# Patient Record
Sex: MALE | Race: WHITE | NOT HISPANIC OR LATINO | Employment: OTHER | URBAN - METROPOLITAN AREA
[De-identification: names, ages, dates, MRNs, and addresses within clinical notes are randomized per-mention and may not be internally consistent; named-entity substitution may affect disease eponyms.]

---

## 2021-06-21 PROCEDURE — 88305 TISSUE EXAM BY PATHOLOGIST: CPT | Performed by: INTERNAL MEDICINE

## 2021-06-22 ENCOUNTER — LAB REQUISITION (OUTPATIENT)
Dept: LAB | Facility: HOSPITAL | Age: 56
End: 2021-06-22

## 2021-06-22 DIAGNOSIS — Z00.00 ENCOUNTER FOR GENERAL ADULT MEDICAL EXAMINATION WITHOUT ABNORMAL FINDINGS: ICD-10-CM

## 2021-06-23 LAB
LAB AP CASE REPORT: NORMAL
PATH REPORT.FINAL DX SPEC: NORMAL
PATH REPORT.GROSS SPEC: NORMAL

## 2022-02-05 ENCOUNTER — HOSPITAL ENCOUNTER (EMERGENCY)
Facility: HOSPITAL | Age: 57
Discharge: HOME | End: 2022-02-05
Attending: EMERGENCY MEDICINE | Admitting: EMERGENCY MEDICINE

## 2022-02-05 ENCOUNTER — APPOINTMENT (OUTPATIENT)
Dept: GENERAL RADIOLOGY | Facility: HOSPITAL | Age: 57
End: 2022-02-05

## 2022-02-05 VITALS
BODY MASS INDEX: 24.44 KG/M2 | OXYGEN SATURATION: 94 % | RESPIRATION RATE: 18 BRPM | SYSTOLIC BLOOD PRESSURE: 133 MMHG | WEIGHT: 165 LBS | TEMPERATURE: 98.2 F | HEIGHT: 69 IN | HEART RATE: 71 BPM | DIASTOLIC BLOOD PRESSURE: 94 MMHG
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
## 2022-02-05 DIAGNOSIS — M25.522 ELBOW PAIN, LEFT: ICD-10-CM

## 2022-02-05 DIAGNOSIS — M94.0 COSTOCHONDRITIS: ICD-10-CM

## 2022-02-05 PROCEDURE — 99283 EMERGENCY DEPT VISIT LOW MDM: CPT

## 2022-02-05 PROCEDURE — 71101 X-RAY EXAM UNILAT RIBS/CHEST: CPT

## 2022-02-05 PROCEDURE — 73070 X-RAY EXAM OF ELBOW: CPT

## 2022-02-05 RX ORDER — IBUPROFEN 400 MG/1
800 TABLET ORAL ONCE
Status: COMPLETED | OUTPATIENT
Start: 2022-02-05 | End: 2022-02-05

## 2022-02-05 RX ADMIN — IBUPROFEN 800 MG: 400 TABLET, FILM COATED ORAL at 09:06

## 2022-02-05 NOTE — ED PROVIDER NOTES
Subjective   Patient presents today d/t a fall that occurred this morning.  Patient states that he was in a car lot and was looking in a car and tripped and fell on ice and  landed on his left side and hurt his left elbow and ribs.  Patient was not given analgesics upon arrival.  Patient rates the pain 8 out of 10, pain is localized to the left ribs with no radiation.  Patient states that it hurts to laugh and inhale deeply. Pt denies any blood thinners. Pt denies hitting his head.       History provided by:  Patient   used: No    Fall  Mechanism of injury: fall    Injury location:  Shoulder/arm and torso  Shoulder/arm injury location:  L elbow  Torso injury location:  L chest  Incident location: car lot.  Arrived directly from scene: yes    Fall:     Fall occurred: slipped on ice.    Impact surface:  [place]    Point of impact: L arm     Entrapped after fall: no    Prior to arrival data:     Bystander interventions:  None    Blood loss:  None    Loss of consciousness: no    Associated symptoms: no loss of consciousness, no nausea and no vomiting        Review of Systems   Constitutional: Negative.  Negative for chills and fever.   HENT: Negative.    Eyes: Negative.    Respiratory: Negative.    Cardiovascular: Negative.    Gastrointestinal: Negative.  Negative for diarrhea, nausea and vomiting.   Endocrine: Negative.    Genitourinary: Negative.    Musculoskeletal: Positive for myalgias (L side of chest ).   Skin: Negative.    Allergic/Immunologic: Negative.    Neurological: Negative.  Negative for loss of consciousness.   Hematological: Negative.    Psychiatric/Behavioral: Negative.        History reviewed. No pertinent past medical history.    Allergies   Allergen Reactions   • Hydrocodone GI Intolerance     N/V         History reviewed. No pertinent surgical history.    History reviewed. No pertinent family history.    Social History     Socioeconomic History   • Marital status:             Objective   Physical Exam  Vitals and nursing note reviewed.   Constitutional:       Appearance: Normal appearance.   HENT:      Head: Normocephalic and atraumatic.      Nose: Nose normal.      Mouth/Throat:      Mouth: Mucous membranes are moist.   Eyes:      Extraocular Movements: Extraocular movements intact.   Cardiovascular:      Rate and Rhythm: Normal rate and regular rhythm.   Pulmonary:      Effort: Pulmonary effort is normal.      Breath sounds: Normal breath sounds.   Musculoskeletal:         General: Tenderness present. No swelling. Normal range of motion.      Cervical back: Normal range of motion and neck supple.      Comments: TTP over the L elbow and L thoracic   No erythema ecchymosis noted   Full ROM of the L arm      Skin:     General: Skin is warm and dry.   Neurological:      General: No focal deficit present.      Mental Status: He is alert and oriented to person, place, and time.   Psychiatric:         Mood and Affect: Mood normal.         Behavior: Behavior normal.           MDM  Number of Diagnoses or Management Options  Costochondritis  Elbow pain, left  Fall, initial encounter  Diagnosis management comments: I have spoken with patient. I have explained the patient´s condition, diagnoses and treatment plan based on the information available to me at this time. I have answered the patient's questions and addressed any concerns. The patient has a good  understanding of the patient´s diagnosis, condition, and treatment plan as can be expected at this point. The vital signs have been stable. The patient´s condition is stable and appropriate for discharge from the emergency department.      The patient will pursue further outpatient evaluation with the primary care physician or other designated or consulting physician as outlined in the discharge instructions. They are agreeable to this plan of care and follow-up instructions have been explained in detail. The patient has received  these instructions in written format and have expressed an understanding of the discharge instructions. The patient is aware that any significant change in condition or worsening of symptoms should prompt an immediate return to this or the closest emergency department or call to 911.         Amount and/or Complexity of Data Reviewed  Tests in the radiology section of CPT®: reviewed and ordered    Risk of Complications, Morbidity, and/or Mortality  Presenting problems: low  Diagnostic procedures: low  Management options: low    Patient Progress  Patient progress: stable      Final diagnoses:   Fall, initial encounter   Elbow pain, left   Costochondritis       ED Disposition  ED Disposition     ED Disposition Condition Comment    Discharge Stable           Provider, No Known  [place] [id number]      If symptoms worsen         Medication List      No changes were made to your prescriptions during this visit.          Trina De La O PA-C  02/05/22 0907

## 2022-02-05 NOTE — DISCHARGE INSTRUCTIONS
Rest, can apply heat/ice to Lateral L chest 20 minutes on/off as needed.  Take ibuprofen as needed for pain. Can take up to 2400 mg/24 hours.

## 2022-02-09 ENCOUNTER — APPOINTMENT (OUTPATIENT)
Dept: CT IMAGING | Facility: HOSPITAL | Age: 57
End: 2022-02-09

## 2022-02-09 ENCOUNTER — APPOINTMENT (OUTPATIENT)
Dept: GENERAL RADIOLOGY | Facility: HOSPITAL | Age: 57
End: 2022-02-09

## 2022-02-09 ENCOUNTER — HOSPITAL ENCOUNTER (INPATIENT)
Facility: HOSPITAL | Age: 57
LOS: 2 days | Discharge: HOME | End: 2022-02-11
Attending: EMERGENCY MEDICINE | Admitting: INTERNAL MEDICINE

## 2022-02-09 DIAGNOSIS — S27.0XXA CLOSED TRAUMATIC FRACTURE OF RIBS OF LEFT SIDE WITH PNEUMOTHORAX: Primary | ICD-10-CM

## 2022-02-09 DIAGNOSIS — S22.42XA CLOSED TRAUMATIC FRACTURE OF RIBS OF LEFT SIDE WITH PNEUMOTHORAX: Primary | ICD-10-CM

## 2022-02-09 PROBLEM — K57.92 DIVERTICULITIS: Status: ACTIVE | Noted: 2022-02-09

## 2022-02-09 PROBLEM — R09.02 HYPOXIA: Status: ACTIVE | Noted: 2022-02-09

## 2022-02-09 PROBLEM — K21.9 GASTROESOPHAGEAL REFLUX DISEASE: Status: ACTIVE | Noted: 2019-01-07

## 2022-02-09 PROBLEM — Z86.711 HISTORY OF PULMONARY EMBOLISM: Status: ACTIVE | Noted: 2022-02-09

## 2022-02-09 PROBLEM — M75.42 INTERNAL IMPINGEMENT OF LEFT SHOULDER: Status: ACTIVE | Noted: 2022-02-09

## 2022-02-09 PROBLEM — R91.1 LUNG NODULE: Status: ACTIVE | Noted: 2019-01-07

## 2022-02-09 PROBLEM — R06.83 SNORING: Status: ACTIVE | Noted: 2022-02-09

## 2022-02-09 PROBLEM — M17.12 PRIMARY OSTEOARTHRITIS OF LEFT KNEE: Status: ACTIVE | Noted: 2020-08-17

## 2022-02-09 PROBLEM — J45.909 ASTHMA: Status: ACTIVE | Noted: 2021-06-30

## 2022-02-09 PROBLEM — Z98.890 HISTORY OF SHOULDER SURGERY: Status: ACTIVE | Noted: 2021-02-16

## 2022-02-09 PROBLEM — S43.439A GLENOID LABRUM TEAR: Status: ACTIVE | Noted: 2020-10-15

## 2022-02-09 PROBLEM — Z91.89 HISTORY OF ANXIETY STATE: Status: ACTIVE | Noted: 2020-12-31

## 2022-02-09 PROBLEM — Z96.9 RETAINED ORTHOPEDIC HARDWARE: Status: ACTIVE | Noted: 2022-02-09

## 2022-02-09 PROBLEM — H91.90 HEARING LOSS: Status: ACTIVE | Noted: 2022-02-09

## 2022-02-09 LAB
ALBUMIN SERPL-MCNC: 4.5 G/DL (ref 3.5–5.2)
ALBUMIN/GLOB SERPL: 1.5 G/DL
ALP SERPL-CCNC: 99 U/L (ref 39–117)
ALT SERPL W P-5'-P-CCNC: 9 U/L (ref 1–41)
ANION GAP SERPL CALCULATED.3IONS-SCNC: 10 MMOL/L (ref 5–15)
AST SERPL-CCNC: 12 U/L (ref 1–40)
BASOPHILS # BLD AUTO: 0.04 10*3/MM3 (ref 0–0.2)
BASOPHILS NFR BLD AUTO: 0.5 % (ref 0–1.5)
BILIRUB SERPL-MCNC: 0.5 MG/DL (ref 0–1.2)
BUN SERPL-MCNC: 13 MG/DL (ref 6–20)
BUN/CREAT SERPL: 14.8 (ref 7–25)
CALCIUM SPEC-SCNC: 8.9 MG/DL (ref 8.6–10.5)
CHLORIDE SERPL-SCNC: 101 MMOL/L (ref 98–107)
CO2 SERPL-SCNC: 25 MMOL/L (ref 22–29)
CREAT SERPL-MCNC: 0.88 MG/DL (ref 0.76–1.27)
DEPRECATED RDW RBC AUTO: 41.8 FL (ref 37–54)
EOSINOPHIL # BLD AUTO: 0.56 10*3/MM3 (ref 0–0.4)
EOSINOPHIL NFR BLD AUTO: 6.7 % (ref 0.3–6.2)
ERYTHROCYTE [DISTWIDTH] IN BLOOD BY AUTOMATED COUNT: 13.2 % (ref 12.3–15.4)
GFR SERPL CREATININE-BSD FRML MDRD: 89 ML/MIN/1.73
GLOBULIN UR ELPH-MCNC: 3.1 GM/DL
GLUCOSE FLD-MCNC: 13 MG/DL
GLUCOSE SERPL-MCNC: 102 MG/DL (ref 65–99)
HCT VFR BLD AUTO: 48.7 % (ref 37.5–51)
HGB BLD-MCNC: 16.3 G/DL (ref 13–17.7)
IMM GRANULOCYTES # BLD AUTO: 0.02 10*3/MM3 (ref 0–0.05)
IMM GRANULOCYTES NFR BLD AUTO: 0.2 % (ref 0–0.5)
LDH FLD-CCNC: >834 U/L
LYMPHOCYTES # BLD AUTO: 1.4 10*3/MM3 (ref 0.7–3.1)
LYMPHOCYTES NFR BLD AUTO: 16.7 % (ref 19.6–45.3)
MCH RBC QN AUTO: 29.3 PG (ref 26.6–33)
MCHC RBC AUTO-ENTMCNC: 33.5 G/DL (ref 31.5–35.7)
MCV RBC AUTO: 87.6 FL (ref 79–97)
MONOCYTES # BLD AUTO: 0.88 10*3/MM3 (ref 0.1–0.9)
MONOCYTES NFR BLD AUTO: 10.5 % (ref 5–12)
NEUTROPHILS NFR BLD AUTO: 5.47 10*3/MM3 (ref 1.7–7)
NEUTROPHILS NFR BLD AUTO: 65.4 % (ref 42.7–76)
NRBC BLD AUTO-RTO: 0 /100 WBC (ref 0–0.2)
PH FLD: 7.08 [PH]
PLATELET # BLD AUTO: 253 10*3/MM3 (ref 140–450)
PMV BLD AUTO: 9.8 FL (ref 6–12)
POTASSIUM SERPL-SCNC: 3.8 MMOL/L (ref 3.5–5.2)
PROT FLD-MCNC: 5.1 G/DL
PROT SERPL-MCNC: 7.6 G/DL (ref 6–8.5)
RBC # BLD AUTO: 5.56 10*6/MM3 (ref 4.14–5.8)
SARS-COV-2 RNA RESP QL NAA+PROBE: NOT DETECTED
SODIUM SERPL-SCNC: 136 MMOL/L (ref 136–145)
WBC NRBC COR # BLD: 8.37 10*3/MM3 (ref 3.4–10.8)

## 2022-02-09 PROCEDURE — 84157 ASSAY OF PROTEIN OTHER: CPT | Performed by: NURSE PRACTITIONER

## 2022-02-09 PROCEDURE — 85025 COMPLETE CBC W/AUTO DIFF WBC: CPT | Performed by: EMERGENCY MEDICINE

## 2022-02-09 PROCEDURE — 88305 TISSUE EXAM BY PATHOLOGIST: CPT | Performed by: NURSE PRACTITIONER

## 2022-02-09 PROCEDURE — 75989 ABSCESS DRAINAGE UNDER X-RAY: CPT

## 2022-02-09 PROCEDURE — 87070 CULTURE OTHR SPECIMN AEROBIC: CPT | Performed by: NURSE PRACTITIONER

## 2022-02-09 PROCEDURE — 25010000002 HYDROMORPHONE 1 MG/ML SOLUTION: Performed by: RADIOLOGY

## 2022-02-09 PROCEDURE — 83986 ASSAY PH BODY FLUID NOS: CPT | Performed by: NURSE PRACTITIONER

## 2022-02-09 PROCEDURE — 94640 AIRWAY INHALATION TREATMENT: CPT

## 2022-02-09 PROCEDURE — C1729 CATH, DRAINAGE: HCPCS

## 2022-02-09 PROCEDURE — 80053 COMPREHEN METABOLIC PANEL: CPT | Performed by: EMERGENCY MEDICINE

## 2022-02-09 PROCEDURE — 0 LIDOCAINE 1 % SOLUTION: Performed by: RADIOLOGY

## 2022-02-09 PROCEDURE — 87206 SMEAR FLUORESCENT/ACID STAI: CPT | Performed by: NURSE PRACTITIONER

## 2022-02-09 PROCEDURE — 99221 1ST HOSP IP/OBS SF/LOW 40: CPT | Performed by: NURSE PRACTITIONER

## 2022-02-09 PROCEDURE — 83615 LACTATE (LD) (LDH) ENZYME: CPT | Performed by: NURSE PRACTITIONER

## 2022-02-09 PROCEDURE — 82945 GLUCOSE OTHER FLUID: CPT | Performed by: NURSE PRACTITIONER

## 2022-02-09 PROCEDURE — 71046 X-RAY EXAM CHEST 2 VIEWS: CPT

## 2022-02-09 PROCEDURE — U0003 INFECTIOUS AGENT DETECTION BY NUCLEIC ACID (DNA OR RNA); SEVERE ACUTE RESPIRATORY SYNDROME CORONAVIRUS 2 (SARS-COV-2) (CORONAVIRUS DISEASE [COVID-19]), AMPLIFIED PROBE TECHNIQUE, MAKING USE OF HIGH THROUGHPUT TECHNOLOGIES AS DESCRIBED BY CMS-2020-01-R: HCPCS | Performed by: EMERGENCY MEDICINE

## 2022-02-09 PROCEDURE — 87116 MYCOBACTERIA CULTURE: CPT | Performed by: NURSE PRACTITIONER

## 2022-02-09 PROCEDURE — 71250 CT THORAX DX C-: CPT

## 2022-02-09 PROCEDURE — 0W9B30Z DRAINAGE OF LEFT PLEURAL CAVITY WITH DRAINAGE DEVICE, PERCUTANEOUS APPROACH: ICD-10-PCS | Performed by: RADIOLOGY

## 2022-02-09 PROCEDURE — 88112 CYTOPATH CELL ENHANCE TECH: CPT | Performed by: NURSE PRACTITIONER

## 2022-02-09 PROCEDURE — 94799 UNLISTED PULMONARY SVC/PX: CPT

## 2022-02-09 PROCEDURE — 99284 EMERGENCY DEPT VISIT MOD MDM: CPT

## 2022-02-09 RX ORDER — CELECOXIB 100 MG/1
100 CAPSULE ORAL EVERY 12 HOURS SCHEDULED
Status: DISCONTINUED | OUTPATIENT
Start: 2022-02-09 | End: 2022-02-11 | Stop reason: HOSPADM

## 2022-02-09 RX ORDER — UREA 10 %
1 LOTION (ML) TOPICAL NIGHTLY PRN
Status: DISCONTINUED | OUTPATIENT
Start: 2022-02-09 | End: 2022-02-11 | Stop reason: HOSPADM

## 2022-02-09 RX ORDER — ACETAMINOPHEN 325 MG/1
650 TABLET ORAL EVERY 4 HOURS PRN
Status: DISCONTINUED | OUTPATIENT
Start: 2022-02-09 | End: 2022-02-11 | Stop reason: HOSPADM

## 2022-02-09 RX ORDER — BENZONATATE 100 MG/1
200 CAPSULE ORAL 3 TIMES DAILY PRN
Status: DISCONTINUED | OUTPATIENT
Start: 2022-02-09 | End: 2022-02-11 | Stop reason: HOSPADM

## 2022-02-09 RX ORDER — BUDESONIDE AND FORMOTEROL FUMARATE DIHYDRATE 160; 4.5 UG/1; UG/1
2 AEROSOL RESPIRATORY (INHALATION)
Status: DISCONTINUED | OUTPATIENT
Start: 2022-02-09 | End: 2022-02-11 | Stop reason: HOSPADM

## 2022-02-09 RX ORDER — NITROGLYCERIN 0.4 MG/1
0.4 TABLET SUBLINGUAL
Status: DISCONTINUED | OUTPATIENT
Start: 2022-02-09 | End: 2022-02-11 | Stop reason: HOSPADM

## 2022-02-09 RX ORDER — LIDOCAINE HYDROCHLORIDE 10 MG/ML
20 INJECTION, SOLUTION INFILTRATION; PERINEURAL ONCE
Status: COMPLETED | OUTPATIENT
Start: 2022-02-09 | End: 2022-02-09

## 2022-02-09 RX ORDER — CALCIUM CARBONATE 200(500)MG
2 TABLET,CHEWABLE ORAL 2 TIMES DAILY PRN
Status: DISCONTINUED | OUTPATIENT
Start: 2022-02-09 | End: 2022-02-11 | Stop reason: HOSPADM

## 2022-02-09 RX ORDER — OXYCODONE HYDROCHLORIDE 5 MG/1
5 TABLET ORAL EVERY 4 HOURS PRN
Status: DISCONTINUED | OUTPATIENT
Start: 2022-02-09 | End: 2022-02-11 | Stop reason: HOSPADM

## 2022-02-09 RX ORDER — POLYETHYLENE GLYCOL 3350 17 G/17G
17 POWDER, FOR SOLUTION ORAL DAILY PRN
Status: DISCONTINUED | OUTPATIENT
Start: 2022-02-09 | End: 2022-02-11 | Stop reason: HOSPADM

## 2022-02-09 RX ORDER — OMEPRAZOLE 40 MG/1
40 CAPSULE, DELAYED RELEASE ORAL DAILY
COMMUNITY

## 2022-02-09 RX ORDER — ONDANSETRON 4 MG/1
4 TABLET, FILM COATED ORAL EVERY 6 HOURS PRN
Status: DISCONTINUED | OUTPATIENT
Start: 2022-02-09 | End: 2022-02-11 | Stop reason: HOSPADM

## 2022-02-09 RX ORDER — BISACODYL 10 MG
10 SUPPOSITORY, RECTAL RECTAL DAILY PRN
Status: DISCONTINUED | OUTPATIENT
Start: 2022-02-09 | End: 2022-02-11 | Stop reason: HOSPADM

## 2022-02-09 RX ORDER — ACETAMINOPHEN 160 MG/5ML
650 SOLUTION ORAL EVERY 4 HOURS PRN
Status: DISCONTINUED | OUTPATIENT
Start: 2022-02-09 | End: 2022-02-11 | Stop reason: HOSPADM

## 2022-02-09 RX ORDER — HYDROXYZINE HYDROCHLORIDE 25 MG/1
25 TABLET, FILM COATED ORAL 3 TIMES DAILY PRN
Status: DISCONTINUED | OUTPATIENT
Start: 2022-02-09 | End: 2022-02-11 | Stop reason: HOSPADM

## 2022-02-09 RX ORDER — BUDESONIDE AND FORMOTEROL FUMARATE DIHYDRATE 160; 4.5 UG/1; UG/1
2 AEROSOL RESPIRATORY (INHALATION)
COMMUNITY
End: 2022-07-29

## 2022-02-09 RX ORDER — ACETAMINOPHEN 500 MG
1000 TABLET ORAL 3 TIMES DAILY
Status: DISCONTINUED | OUTPATIENT
Start: 2022-02-09 | End: 2022-02-11 | Stop reason: HOSPADM

## 2022-02-09 RX ORDER — ALBUTEROL SULFATE 2.5 MG/3ML
2.5 SOLUTION RESPIRATORY (INHALATION)
Status: DISCONTINUED | OUTPATIENT
Start: 2022-02-09 | End: 2022-02-11 | Stop reason: HOSPADM

## 2022-02-09 RX ORDER — AMOXICILLIN 250 MG
2 CAPSULE ORAL 2 TIMES DAILY
Status: DISCONTINUED | OUTPATIENT
Start: 2022-02-09 | End: 2022-02-11 | Stop reason: HOSPADM

## 2022-02-09 RX ORDER — SODIUM CHLORIDE 0.9 % (FLUSH) 0.9 %
10 SYRINGE (ML) INJECTION AS NEEDED
Status: DISCONTINUED | OUTPATIENT
Start: 2022-02-09 | End: 2022-02-11 | Stop reason: HOSPADM

## 2022-02-09 RX ORDER — BISACODYL 5 MG/1
5 TABLET, DELAYED RELEASE ORAL DAILY PRN
Status: DISCONTINUED | OUTPATIENT
Start: 2022-02-09 | End: 2022-02-11 | Stop reason: HOSPADM

## 2022-02-09 RX ORDER — PANTOPRAZOLE SODIUM 40 MG/1
40 TABLET, DELAYED RELEASE ORAL EVERY MORNING
Status: DISCONTINUED | OUTPATIENT
Start: 2022-02-10 | End: 2022-02-11 | Stop reason: HOSPADM

## 2022-02-09 RX ORDER — IBUPROFEN 800 MG/1
800 TABLET ORAL DAILY
COMMUNITY
End: 2022-02-11 | Stop reason: HOSPADM

## 2022-02-09 RX ORDER — ACETAMINOPHEN 650 MG/1
650 SUPPOSITORY RECTAL EVERY 4 HOURS PRN
Status: DISCONTINUED | OUTPATIENT
Start: 2022-02-09 | End: 2022-02-11 | Stop reason: HOSPADM

## 2022-02-09 RX ORDER — GABAPENTIN 300 MG/1
300 CAPSULE ORAL 3 TIMES DAILY
Status: DISCONTINUED | OUTPATIENT
Start: 2022-02-09 | End: 2022-02-11 | Stop reason: HOSPADM

## 2022-02-09 RX ORDER — LIDOCAINE 50 MG/G
1 PATCH TOPICAL
Status: DISCONTINUED | OUTPATIENT
Start: 2022-02-09 | End: 2022-02-11 | Stop reason: HOSPADM

## 2022-02-09 RX ORDER — ONDANSETRON 2 MG/ML
4 INJECTION INTRAMUSCULAR; INTRAVENOUS EVERY 6 HOURS PRN
Status: DISCONTINUED | OUTPATIENT
Start: 2022-02-09 | End: 2022-02-11 | Stop reason: HOSPADM

## 2022-02-09 RX ORDER — ALBUTEROL SULFATE 90 UG/1
2 AEROSOL, METERED RESPIRATORY (INHALATION) EVERY 4 HOURS PRN
Status: ON HOLD | COMMUNITY
End: 2022-02-11 | Stop reason: SDUPTHER

## 2022-02-09 RX ADMIN — OXYCODONE 5 MG: 5 TABLET ORAL at 23:27

## 2022-02-09 RX ADMIN — LIDOCAINE HYDROCHLORIDE 20 ML: 10 INJECTION, SOLUTION INFILTRATION; PERINEURAL at 16:11

## 2022-02-09 RX ADMIN — OXYCODONE 5 MG: 5 TABLET ORAL at 19:48

## 2022-02-09 RX ADMIN — GABAPENTIN 300 MG: 300 CAPSULE ORAL at 20:24

## 2022-02-09 RX ADMIN — CELECOXIB 100 MG: 100 CAPSULE ORAL at 20:24

## 2022-02-09 RX ADMIN — HYDROXYZINE HYDROCHLORIDE 25 MG: 25 TABLET ORAL at 23:27

## 2022-02-09 RX ADMIN — HYDROMORPHONE HYDROCHLORIDE 1 MG: 1 INJECTION, SOLUTION INTRAMUSCULAR; INTRAVENOUS; SUBCUTANEOUS at 15:44

## 2022-02-09 RX ADMIN — ALBUTEROL SULFATE 2.5 MG: 2.5 SOLUTION RESPIRATORY (INHALATION) at 23:41

## 2022-02-09 RX ADMIN — DOCUSATE SODIUM 50MG AND SENNOSIDES 8.6MG 2 TABLET: 8.6; 5 TABLET, FILM COATED ORAL at 20:24

## 2022-02-09 RX ADMIN — LIDOCAINE 1 PATCH: 50 PATCH TOPICAL at 13:14

## 2022-02-09 RX ADMIN — ACETAMINOPHEN 1000 MG: 500 TABLET ORAL at 20:24

## 2022-02-09 NOTE — ED PROVIDER NOTES
EMERGENCY DEPARTMENT ENCOUNTER    Room Number:  10/10  Date of encounter:  2/9/2022  PCP: Provider, No Known  Patient Care Team:  Provider, No Known as PCP - General  Provider, No Known as PCP - Family Medicine   Historian: Patient    HPI:  Chief Complaint: Left rib pain, productive cough  A complete HPI/ROS/PMH/PSH/SH/FH are unobtainable due to: Nothing    Context: Sylvester Tate is a 56 y.o. male who presents to the ED c/o having left rib pain and a productive cough.  He reports that on Saturday he slipped on the ice and fell.  He hit his left ribs against his left elbow.  He states that he went to the hospital and had an x-ray of his left elbow and his left ribs.  He states he has been taking 800 mg ibuprofen without improvement.  He states that he has asthma and typically has a cough.  He states that his cough started to be productive of a pinkish sputum today and yesterday.  He denies any new injury.  He states a friend of his gave him 2 Percocet last evening to help with the pain.    Prior record review: ER visit from 2/5/2022 for left elbow pain and costochondritis    PAST MEDICAL HISTORY  Active Ambulatory Problems     Diagnosis Date Noted   • No Active Ambulatory Problems     Resolved Ambulatory Problems     Diagnosis Date Noted   • No Resolved Ambulatory Problems     No Additional Past Medical History       The patient qualifies to receive the vaccine, but they have not yet received it.    PAST SURGICAL HISTORY  No past surgical history on file.      FAMILY HISTORY  No family history on file.      SOCIAL HISTORY  Social History     Socioeconomic History   • Marital status:          ALLERGIES  Hydrocodone        REVIEW OF SYSTEMS  Review of Systems   Positive rib pain, negative shortness of breath, negative nausea, negative vomiting, negative fever, negative abdominal pain  All systems reviewed and negative except for those discussed in HPI.       PHYSICAL EXAM    I have reviewed the triage vital  signs and nursing notes.    ED Triage Vitals [02/09/22 1235]   Temp Heart Rate Resp BP SpO2   97.7 °F (36.5 °C) 76 18 -- 99 %      Temp src Heart Rate Source Patient Position BP Location FiO2 (%)   Tympanic Monitor -- -- --       Physical Exam  GENERAL: Awake, alert, no acute distress  SKIN: Warm, dry  HENT: Normocephalic, atraumatic  EYES: no scleral icterus  CV: regular rhythm, regular rate  RESPIRATORY: normal effort, lungs clear.  Chest wall tenderness on the left which reproduces his pain.  ABDOMEN: soft, nontender, nondistended  MUSCULOSKELETAL: no deformity  NEURO: alert, moves all extremities, follows commands          LAB RESULTS  Recent Results (from the past 24 hour(s))   CBC Auto Differential    Collection Time: 02/09/22  1:34 PM    Specimen: Blood   Result Value Ref Range    WBC 8.37 3.40 - 10.80 10*3/mm3    RBC 5.56 4.14 - 5.80 10*6/mm3    Hemoglobin 16.3 13.0 - 17.7 g/dL    Hematocrit 48.7 37.5 - 51.0 %    MCV 87.6 79.0 - 97.0 fL    MCH 29.3 26.6 - 33.0 pg    MCHC 33.5 31.5 - 35.7 g/dL    RDW 13.2 12.3 - 15.4 %    RDW-SD 41.8 37.0 - 54.0 fl    MPV 9.8 6.0 - 12.0 fL    Platelets 253 140 - 450 10*3/mm3    Neutrophil % 65.4 42.7 - 76.0 %    Lymphocyte % 16.7 (L) 19.6 - 45.3 %    Monocyte % 10.5 5.0 - 12.0 %    Eosinophil % 6.7 (H) 0.3 - 6.2 %    Basophil % 0.5 0.0 - 1.5 %    Immature Grans % 0.2 0.0 - 0.5 %    Neutrophils, Absolute 5.47 1.70 - 7.00 10*3/mm3    Lymphocytes, Absolute 1.40 0.70 - 3.10 10*3/mm3    Monocytes, Absolute 0.88 0.10 - 0.90 10*3/mm3    Eosinophils, Absolute 0.56 (H) 0.00 - 0.40 10*3/mm3    Basophils, Absolute 0.04 0.00 - 0.20 10*3/mm3    Immature Grans, Absolute 0.02 0.00 - 0.05 10*3/mm3    nRBC 0.0 0.0 - 0.2 /100 WBC       Ordered the above labs and independently reviewed the results.        RADIOLOGY  XR Chest 2 View    Result Date: 2/9/2022  XR CHEST 2 VW-  HISTORY:  Fell Saturday, injured left ribs.  COMPARISON:  Chest radiograph 07/03/2015  FINDINGS:  2 views of the chest were  obtained. The cardiac silhouette and mediastinal and hilar contours are within normal limits. There is a moderate to large left hydropneumothorax. The apical pneumothorax component measures up to at least 6.3 cm in craniocaudal dimension. There is also a basilar component of the pneumothorax. There is a small layering pleural fluid component. There is atelectasis of the lower left lung. The right lung and pleural space are clear. There is an acute fracture of lateral left rib 7. There is mild irregularity of lateral left rib 9, which may reflect an additional fracture. There is an old fracture of posterolateral right rib 7. Consider further evaluation with dedicated rib radiographs or CT. There are small degenerative endplate osteophytes at a few levels spine.  Discussed with Dr. Schwartz at 1:15 PM.  This report was finalized on 2/9/2022 1:17 PM by Dr. Josefina Betts M.D.        I ordered the above noted radiological studies. Reviewed by me and discussed with radiologist.  See dictation for official radiology interpretation.      PROCEDURES    Procedures      MEDICATIONS GIVEN IN ER    Medications   lidocaine (LIDODERM) 5 % 1 patch (1 patch Transdermal Medication Applied 2/9/22 1314)   sodium chloride 0.9 % flush 10 mL (has no administration in time range)         PROGRESS, DATA ANALYSIS, CONSULTS, AND MEDICAL DECISION MAKING    All labs have been independently reviewed by me.  All radiology studies have been reviewed by me and discussed with radiologist dictating the report.   EKG's independently viewed and interpreted by me.  Discussion below represents my analysis of pertinent findings related to patient's condition, differential diagnosis, treatment plan and final disposition.    Differential diagnosis includes but is not limited to pneumothorax, pneumonia, PE, CHF, bronchitis.    ED Course as of 02/09/22 1408   Wed Feb 09, 2022   1315 Speaking with radiologist by phone.  X-ray shows fractures of 7 and 9 on the  left.  Moderate size hydropneumothorax. [TR]   1337 Speaking with thoracic surgery.  They request IR to place chest tube.  They request hospitalist to admit. [TR]   1407 Speaking with Dr. Carson with [facility].  Will admit. [TR]      ED Course User Index  [TR] Elijah Schwartz MD           PPE: The patient wore a mask and I wore an N95 mask throughout the entire patient encounter.       AS OF 14:08 EST VITALS:    BP -    HR - 76  TEMP - 97.7 °F (36.5 °C) (Tympanic)  O2 SATS - 99%        DIAGNOSIS  Final diagnoses:   Closed traumatic fracture of ribs of left side with pneumothorax         DISPOSITION  ED Disposition     ED Disposition Condition Comment    Decision to Admit  Level of Care: Telemetry [5]   Diagnosis: Closed traumatic fracture of ribs of left side with pneumothorax [[id number]]   Admitting Physician: SINGH OTOOLE [[id number]]   Attending Physician: SINGH OTOOLE [[id number]]                  Elijah Schwartz MD  02/09/22 6573

## 2022-02-09 NOTE — ED TRIAGE NOTES
Pt states that on Saturday he fell and injured his left ribs. Was seen at [facility] and had an Xray that was negative. Pt states that he has asthma and frequently coughs up mucous. States that the mucous is now pink and red. Patient masked at arrival and triage staff wore all appropriate PPE during entire encounter with patient.

## 2022-02-09 NOTE — PLAN OF CARE
Goal Outcome Evaluation:  Plan of Care Reviewed With: patient        Progress: improving  Outcome Summary: Patient arrived from ED; VSS on room air. Left chest tube to -20 LWS; no air leak noted. No c/o SOA. Will continue supportive care.

## 2022-02-09 NOTE — CONSULTS
General MD Inpatient Consult  Consult performed by: Noelle Lazaro, SAMMY, APRN  Consult ordered by: Elijah Schwartz MD          Patient Care Team:  Provider, No Known as PCP - General  Provider, No Known as PCP - Family Medicine    Chief Complaint   Patient presents with   • Fall   • Rib Pain   • Coughing Up Blood       Subjective     History of Present Illness    Mr. Tate is a pleasant 56-year-old man with a past medical history of GERD, asthma and anxiety.  He presented to [facility] ER on 2/9/22 for increased left-sided chest wall pain and shortness of breath after a fall on ice on 2/5/2022. He was initially seen at [facility].  Chest x-rays were negative and he was discharged in stable condition, per patient.  He became concerned when he developed a productive cough with pink-tinged sputum today and came to [facility] for further evaluation.    A chest x-ray was performed upon presentation to the ER which demonstrated a large left pneumothorax, for which we have been asked to see this gentleman.    Upon exam today, the patient is alert and resting in bed on room air.  He reports left-sided chest wall pain that is increased with movement and coughing.  He developed a productive cough over the past 1 to 2 days with some dyspnea upon exertion.  He does not require any supplemental oxygen at baseline.  Prior to his fall, he was able to perform his daily duties without difficulty.         Review of Systems   Constitutional: Negative.    HENT: Negative.    Eyes: Negative.    Respiratory: Positive for chest tightness and shortness of breath.    Cardiovascular: Positive for chest pain.   Gastrointestinal: Negative.    Endocrine: Negative.    Genitourinary: Negative.    Musculoskeletal: Negative.    Skin: Negative.    Allergic/Immunologic: Negative.    Neurological: Negative.    Hematological: Negative.    Psychiatric/Behavioral: Negative.         Patient Active Problem List    Diagnosis   • Closed traumatic fracture of ribs of left side with pneumothorax   • History of pulmonary embolism   • Retained orthopedic hardware   • Asthma   • Diverticulitis   • Gastroesophageal reflux disease   • Hearing loss   • History of anxiety state   • Lung nodule   • Snoring   • Primary osteoarthritis of left knee   • Internal impingement of left shoulder   • Glenoid labrum tear   • History of shoulder surgery     Past Medical History:   Diagnosis Date   • Asthma 6/30/2021   • Diverticulitis 2/9/2022   • Gastroesophageal reflux disease 1/7/2019    Formatting of this note might be different from the original. takes prilosec takes prilosec takes prilosec   • Hearing loss 2/9/2022    got hearing aids 10yrs ago, needs new ones, doesn't wear them now   • History of anxiety state 12/31/2020   • History of pulmonary embolism 2/9/2022    had tooth extracted 2 weeks prior, was in  for six days at that time for testing, no cause discovered   • Primary osteoarthritis of left knee 8/17/2020   • Retained orthopedic hardware 2/9/2022    left arm   • Snoring 2/9/2022    no formal sleep study     No past surgical history on file.  No family history on file.  Social History     Socioeconomic History   • Marital status:      (Not in a hospital admission)    Allergies   Allergen Reactions   • Hydrocodone GI Intolerance     N/V         Objective      Vital Signs  Temp:  [97.7 °F (36.5 °C)] 97.7 °F (36.5 °C)  Heart Rate:  [70-87] 70  Resp:  [16-18] 16  BP: (108-129)/() 127/82    Intake & Output (last day)     None          Physical Exam  Constitutional:       Appearance: Normal appearance. He is not ill-appearing.   HENT:      Head: Normocephalic and atraumatic.   Cardiovascular:      Rate and Rhythm: Normal rate.   Pulmonary:      Effort: Pulmonary effort is normal. No respiratory distress.      Breath sounds: Decreased breath sounds present. No wheezing.   Chest:      Chest wall: Tenderness (left lateral)  present. No crepitus.   Abdominal:      General: Abdomen is flat.   Musculoskeletal:         General: Normal range of motion.      Cervical back: Normal range of motion and neck supple.   Skin:     General: Skin is warm and dry.      Capillary Refill: Capillary refill takes less than 2 seconds.      Findings: No bruising.   Neurological:      General: No focal deficit present.      Mental Status: He is alert. Mental status is at baseline.   Psychiatric:         Mood and Affect: Mood normal.         Results Review:    I reviewed the patient's new clinical results.  I reviewed the patient's new imaging results and agree with the interpretation.  I reviewed the patient's other test results and agree with the interpretation  Discussed with Patient, RN, Dr. Schwartz and Dr. Aguilar.    Imaging Results (Last 24 Hours)     Procedure Component Value Units Date/Time    XR Chest 2 View [[id number]] Collected: 02/09/22 1312     Updated: 02/09/22 1320    Narrative:      XR CHEST 2 VW-     HISTORY:  Fell Saturday, injured left ribs.     COMPARISON:  Chest radiograph 07/03/2015     FINDINGS:    2 views of the chest were obtained. The cardiac silhouette and  mediastinal and hilar contours are within normal limits. There is a  moderate to large left hydropneumothorax. The apical pneumothorax  component measures up to at least 6.3 cm in craniocaudal dimension.  There is also a basilar component of the pneumothorax. There is a small  layering pleural fluid component. There is atelectasis of the lower left  lung. The right lung and pleural space are clear. There is an acute  fracture of lateral left rib 7. There is mild irregularity of lateral  left rib 9, which may reflect an additional fracture. There is an old  fracture of posterolateral right rib 7. Consider further evaluation with  dedicated rib radiographs or CT. There are small degenerative endplate  osteophytes at a few levels spine.     Discussed with Dr. Schwartz at 1:15 PM.      This report was finalized on 2/9/2022 1:17 PM by Dr. Josefina Betts M.D.             Lab Results:  Lab Results (last 24 hours)     Procedure Component Value Units Date/Time    COVID PRE-OP / PRE-PROCEDURE SCREENING ORDER (NO ISOLATION) - Swab, Nasopharynx [[id number]]  (Normal) Collected: 02/09/22 1334    Specimen: Swab from Nasopharynx Updated: 02/09/22 1429    Narrative:      The following orders were created for panel order COVID PRE-OP / PRE-PROCEDURE SCREENING ORDER (NO ISOLATION) - Swab, Nasopharynx.  Procedure                               Abnormality         Status                     ---------                               -----------         ------                     COVID-19,[facility] IN-HOUSE...[[id number]]  Normal              Final result                 Please view results for these tests on the individual orders.    COVID-19,[facility] IN-HOUSE CEPHEID/ARUNA NP SWAB IN TRANSPORT MEDIA 8-12 HR TAT - Swab, Nasopharynx [[id number]]  (Normal) Collected: 02/09/22 1334    Specimen: Swab from Nasopharynx Updated: 02/09/22 1429     COVID19 Not Detected    Narrative:      Fact sheet for providers: https://www.fda.gov/media/427016/download     Fact sheet for patients: https://www.fda.gov/media/674608/download    Comprehensive Metabolic Panel [[id number]]  (Abnormal) Collected: 02/09/22 1334    Specimen: Blood Updated: 02/09/22 1409     Glucose 102 mg/dL      BUN 13 mg/dL      Creatinine 0.88 mg/dL      Sodium 136 mmol/L      Potassium 3.8 mmol/L      Comment: Slight hemolysis detected by analyzer. Results may be affected.        Chloride 101 mmol/L      CO2 25.0 mmol/L      Calcium 8.9 mg/dL      Total Protein 7.6 g/dL      Albumin 4.50 g/dL      ALT (SGPT) 9 U/L      AST (SGOT) 12 U/L      Alkaline Phosphatase 99 U/L      Total Bilirubin 0.5 mg/dL      eGFR Non African Amer 89 mL/min/1.73      Globulin 3.1 gm/dL      A/G Ratio 1.5 g/dL      BUN/Creatinine Ratio 14.8     Anion Gap 10.0 mmol/L     Narrative:      GFR  Normal >60  Chronic Kidney Disease <60  Kidney Failure <15      CBC & Differential [[id number]]  (Abnormal) Collected: 02/09/22 1334    Specimen: Blood Updated: 02/09/22 1352    Narrative:      The following orders were created for panel order CBC & Differential.  Procedure                               Abnormality         Status                     ---------                               -----------         ------                     CBC Auto Differential[[id number]]        Abnormal            Final result                 Please view results for these tests on the individual orders.    CBC Auto Differential [[id number]]  (Abnormal) Collected: 02/09/22 1334    Specimen: Blood Updated: 02/09/22 1352     WBC 8.37 10*3/mm3      RBC 5.56 10*6/mm3      Hemoglobin 16.3 g/dL      Hematocrit 48.7 %      MCV 87.6 fL      MCH 29.3 pg      MCHC 33.5 g/dL      RDW 13.2 %      RDW-SD 41.8 fl      MPV 9.8 fL      Platelets 253 10*3/mm3      Neutrophil % 65.4 %      Lymphocyte % 16.7 %      Monocyte % 10.5 %      Eosinophil % 6.7 %      Basophil % 0.5 %      Immature Grans % 0.2 %      Neutrophils, Absolute 5.47 10*3/mm3      Lymphocytes, Absolute 1.40 10*3/mm3      Monocytes, Absolute 0.88 10*3/mm3      Eosinophils, Absolute 0.56 10*3/mm3      Basophils, Absolute 0.04 10*3/mm3      Immature Grans, Absolute 0.02 10*3/mm3      nRBC 0.0 /100 WBC               Assessment/Plan       Closed traumatic fracture of ribs of left side with pneumothorax      Assessment & Plan    I have independently reviewed the chest x-rays performed upon admission which demonstrate a large left hydropneumothorax with left basilar atelectasis.  Left seventh rib fracture and possible left ninth rib fracture.  No acute airspace disease or pneumothorax on the right.    Left hydro-pneumothorax: Etiology likely secondary to rib fractures.  Order for CT-guided chest tube to be placed in IR.  Chest tube to be set to -20 cm of suction.  Recheck chest x-ray in the  morning.  Recommend incentive spirometry 10 times per hour.    Left rib fractures: CT chest with 3D reconstruction has been ordered.  We will initiate rib fracture protocol medications for conservative management.    I discussed the patients findings and our recommendations with patient, nursing staff, consulting provider and Dr. Aguilar.    Thank you for this consult and allowing us to participate in the care of your patient.  We will follow along with you during this hospitalization.       Noelle Lazaro DNP, APRN  [facility]  02/09/22  15:55 EST      I spent >62 minutes reviewing the patient's chart including medical history, notes, radiographic imaging, labs and performing an assessment and development of a plan and discussion with the patient/family at bedside.

## 2022-02-10 ENCOUNTER — APPOINTMENT (OUTPATIENT)
Dept: GENERAL RADIOLOGY | Facility: HOSPITAL | Age: 57
End: 2022-02-10

## 2022-02-10 LAB
ANION GAP SERPL CALCULATED.3IONS-SCNC: 10.7 MMOL/L (ref 5–15)
BASOPHILS # BLD AUTO: 0.06 10*3/MM3 (ref 0–0.2)
BASOPHILS NFR BLD AUTO: 1.2 % (ref 0–1.5)
BUN SERPL-MCNC: 14 MG/DL (ref 6–20)
BUN/CREAT SERPL: 19.4 (ref 7–25)
CALCIUM SPEC-SCNC: 8.8 MG/DL (ref 8.6–10.5)
CHLORIDE SERPL-SCNC: 103 MMOL/L (ref 98–107)
CO2 SERPL-SCNC: 25.3 MMOL/L (ref 22–29)
CREAT SERPL-MCNC: 0.72 MG/DL (ref 0.76–1.27)
DEPRECATED RDW RBC AUTO: 43.2 FL (ref 37–54)
EOSINOPHIL # BLD AUTO: 0.55 10*3/MM3 (ref 0–0.4)
EOSINOPHIL NFR BLD AUTO: 10.8 % (ref 0.3–6.2)
ERYTHROCYTE [DISTWIDTH] IN BLOOD BY AUTOMATED COUNT: 13.4 % (ref 12.3–15.4)
GFR SERPL CREATININE-BSD FRML MDRD: 113 ML/MIN/1.73
GLUCOSE SERPL-MCNC: 98 MG/DL (ref 65–99)
HCT VFR BLD AUTO: 44.8 % (ref 37.5–51)
HGB BLD-MCNC: 15.6 G/DL (ref 13–17.7)
IMM GRANULOCYTES # BLD AUTO: 0.01 10*3/MM3 (ref 0–0.05)
IMM GRANULOCYTES NFR BLD AUTO: 0.2 % (ref 0–0.5)
LYMPHOCYTES # BLD AUTO: 1.53 10*3/MM3 (ref 0.7–3.1)
LYMPHOCYTES NFR BLD AUTO: 30.2 % (ref 19.6–45.3)
MAGNESIUM SERPL-MCNC: 2.2 MG/DL (ref 1.6–2.6)
MCH RBC QN AUTO: 30.2 PG (ref 26.6–33)
MCHC RBC AUTO-ENTMCNC: 34.8 G/DL (ref 31.5–35.7)
MCV RBC AUTO: 86.8 FL (ref 79–97)
MONOCYTES # BLD AUTO: 0.54 10*3/MM3 (ref 0.1–0.9)
MONOCYTES NFR BLD AUTO: 10.7 % (ref 5–12)
NEUTROPHILS NFR BLD AUTO: 2.38 10*3/MM3 (ref 1.7–7)
NEUTROPHILS NFR BLD AUTO: 46.9 % (ref 42.7–76)
NRBC BLD AUTO-RTO: 0 /100 WBC (ref 0–0.2)
PLATELET # BLD AUTO: 235 10*3/MM3 (ref 140–450)
PMV BLD AUTO: 10.2 FL (ref 6–12)
POTASSIUM SERPL-SCNC: 3.9 MMOL/L (ref 3.5–5.2)
RBC # BLD AUTO: 5.16 10*6/MM3 (ref 4.14–5.8)
SODIUM SERPL-SCNC: 139 MMOL/L (ref 136–145)
TROPONIN T SERPL-MCNC: <0.01 NG/ML (ref 0–0.03)
TSH SERPL DL<=0.05 MIU/L-ACNC: 1.4 UIU/ML (ref 0.27–4.2)
WBC NRBC COR # BLD: 5.07 10*3/MM3 (ref 3.4–10.8)

## 2022-02-10 PROCEDURE — 94760 N-INVAS EAR/PLS OXIMETRY 1: CPT

## 2022-02-10 PROCEDURE — 84443 ASSAY THYROID STIM HORMONE: CPT | Performed by: INTERNAL MEDICINE

## 2022-02-10 PROCEDURE — 80048 BASIC METABOLIC PNL TOTAL CA: CPT | Performed by: INTERNAL MEDICINE

## 2022-02-10 PROCEDURE — 94799 UNLISTED PULMONARY SVC/PX: CPT

## 2022-02-10 PROCEDURE — 83735 ASSAY OF MAGNESIUM: CPT | Performed by: INTERNAL MEDICINE

## 2022-02-10 PROCEDURE — 84484 ASSAY OF TROPONIN QUANT: CPT | Performed by: INTERNAL MEDICINE

## 2022-02-10 PROCEDURE — 94664 DEMO&/EVAL PT USE INHALER: CPT

## 2022-02-10 PROCEDURE — 85025 COMPLETE CBC W/AUTO DIFF WBC: CPT | Performed by: INTERNAL MEDICINE

## 2022-02-10 PROCEDURE — 99232 SBSQ HOSP IP/OBS MODERATE 35: CPT | Performed by: NURSE PRACTITIONER

## 2022-02-10 PROCEDURE — 94640 AIRWAY INHALATION TREATMENT: CPT

## 2022-02-10 PROCEDURE — 71045 X-RAY EXAM CHEST 1 VIEW: CPT

## 2022-02-10 RX ADMIN — ACETAMINOPHEN 1000 MG: 500 TABLET ORAL at 20:13

## 2022-02-10 RX ADMIN — ACETAMINOPHEN 1000 MG: 500 TABLET ORAL at 17:37

## 2022-02-10 RX ADMIN — LIDOCAINE 1 PATCH: 50 PATCH TOPICAL at 08:19

## 2022-02-10 RX ADMIN — GABAPENTIN 300 MG: 300 CAPSULE ORAL at 08:20

## 2022-02-10 RX ADMIN — DOCUSATE SODIUM 50MG AND SENNOSIDES 8.6MG 2 TABLET: 8.6; 5 TABLET, FILM COATED ORAL at 08:19

## 2022-02-10 RX ADMIN — GABAPENTIN 300 MG: 300 CAPSULE ORAL at 20:13

## 2022-02-10 RX ADMIN — BUDESONIDE AND FORMOTEROL FUMARATE DIHYDRATE 2 PUFF: 160; 4.5 AEROSOL RESPIRATORY (INHALATION) at 08:06

## 2022-02-10 RX ADMIN — CELECOXIB 100 MG: 100 CAPSULE ORAL at 08:20

## 2022-02-10 RX ADMIN — GABAPENTIN 300 MG: 300 CAPSULE ORAL at 17:37

## 2022-02-10 RX ADMIN — CELECOXIB 100 MG: 100 CAPSULE ORAL at 20:13

## 2022-02-10 RX ADMIN — BUDESONIDE AND FORMOTEROL FUMARATE DIHYDRATE 2 PUFF: 160; 4.5 AEROSOL RESPIRATORY (INHALATION) at 19:56

## 2022-02-10 RX ADMIN — DOCUSATE SODIUM 50MG AND SENNOSIDES 8.6MG 2 TABLET: 8.6; 5 TABLET, FILM COATED ORAL at 20:14

## 2022-02-10 RX ADMIN — OXYCODONE 5 MG: 5 TABLET ORAL at 06:09

## 2022-02-10 RX ADMIN — PANTOPRAZOLE SODIUM 40 MG: 40 TABLET, DELAYED RELEASE ORAL at 06:09

## 2022-02-10 RX ADMIN — ACETAMINOPHEN 1000 MG: 500 TABLET ORAL at 08:20
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
## 2022-02-10 NOTE — PROGRESS NOTES
[facility]    [phone]   LOS: 1 day     Name: Sylvester Tate  Age/Sex: 56 y.o. male  :  1965        PCP: Provider, No Known  Chief Complaint   Patient presents with   • Fall   • Rib Pain   • Coughing Up Blood      Subjective   He is feeling better today. Cough is less productive. Pain is controlled. Denies fevers or chills overnight rested well  General: No Fever or Chills, Cardiac: No Chest Pain or Palpitations, Resp: No Cough or SOA, GI: No Nausea, Vomiting, or Diarrhea and Other: No bleeding    acetaminophen, 1,000 mg, Oral, TID  budesonide-formoterol, 2 puff, Inhalation, BID - RT  celecoxib, 100 mg, Oral, Q12H  gabapentin, 300 mg, Oral, TID  lidocaine, 1 patch, Transdermal, Q24H  pantoprazole, 40 mg, Oral, QAM  senna-docusate sodium, 2 tablet, Oral, BID           Objective   Vital Signs  Temp:  [97.7 °F (36.5 °C)-98.7 °F (37.1 °C)] 98.7 °F (37.1 °C)  Heart Rate:  [59-87] 76  Resp:  [8-18] 16  BP: (108-144)/() 120/86  Body mass index is 24.37 kg/m².    Intake/Output Summary (Last 24 hours) at 2/10/2022 0815  Last data filed at 2/10/2022 0600  Gross per 24 hour   Intake 720 ml   Output 42 ml   Net 678 ml       Physical Exam  Vitals and nursing note reviewed.   Constitutional:       Appearance: Normal appearance.   Cardiovascular:      Rate and Rhythm: Normal rate and regular rhythm.   Pulmonary:      Effort: No respiratory distress.      Breath sounds: Normal breath sounds.      Comments: Left-sided chest tube in place  Abdominal:      General: Bowel sounds are normal.      Palpations: Abdomen is soft.   Skin:     General: Skin is warm and dry.   Neurological:      General: No focal deficit present.      Mental Status: He is alert and oriented to person, place, and time.           Results Review:       I reviewed the patient's new clinical results.  Results from last 7 days   Lab Units 02/10/22  0617 22  1334   WBC 10*3/mm3 5.07 8.37   HEMOGLOBIN g/dL 15.6 16.3   PLATELETS  10*3/mm3 235 253     Results from last 7 days   Lab Units 02/10/22  0617 02/09/22  1334   SODIUM mmol/L 139 136   POTASSIUM mmol/L 3.9 3.8   CHLORIDE mmol/L 103 101   CO2 mmol/L 25.3 25.0   BUN mg/dL 14 13   CREATININE mg/dL 0.72* 0.88   CALCIUM mg/dL 8.8 8.9   MAGNESIUM mg/dL 2.2  --    Estimated Creatinine Clearance: 119.8 mL/min (A) (by C-G formula based on SCr of 0.72 mg/dL (L)).      Assessment/Plan   Active Hospital Problems    Diagnosis  POA   • Closed traumatic fracture of ribs of left side with pneumothorax [S22.42XA, S27.0XXA]  Yes   • History of pulmonary embolism [Z86.711]  Yes   • Hypoxia [R09.02]  Yes   • Hearing loss [H91.90]  Yes   • Asthma [J45.909]  Yes   • History of anxiety state [Z91.89]  Yes      Resolved Hospital Problems   No resolved problems to display.       PLAN  This is a 56-year-old gentleman who had a fall over the weekend and was doing okay at home but was increasingly short of breath and was having pink-tinged sputum and presented to the emergency room where he was found to have a rib fracture with pneumothorax  -Tolerated chest tube placement  -Still with some residual pneumothorax on x-ray this morning  -Defer management of chest tube to thoracic surgery  -Continue supportive care with aggressive pulmonary hygiene exercises to prevent bacterial pneumonia  -Mechanical DVT prophylaxis  -Full code    -Please encourage out of bed activity and early ambulation and up in chair for meals    Disposition  Hopefully home tomorrow or over the weekend      Gigi Murray MD  [facility]  02/10/22  08:15 EST

## 2022-02-10 NOTE — CASE MANAGEMENT/SOCIAL WORK
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    Primary Care Provided by self    Provides Primary Care For no one    Family Caregiver if Needed child(miya), adult; friend(s)    Quality of Family Relationships supportive    Able to Return to Prior Arrangements yes       Resource/Environmental Concerns    Resource/Environmental Concerns none       Transition Planning    Patient/Family Anticipates Transition to home    Patient/Family Anticipated Services at Transition none    Transportation Anticipated car, drives self; family or friend will provide       Discharge Needs Assessment    Readmission Within the Last 30 Days no previous admission in last 30 days    Equipment Currently Used at Home none    Anticipated Changes Related to Illness none    Provided Post Acute Provider List? N/A    Provided Post Acute Provider Quality & Resource List? N/A               Discharge Plan     Row Name 02/10/22 1058       Plan    Plan Home    Plan Comments S/W pt at bedside.  Facesheet info confirmed.  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 He does not use any home DME.  He is able to drive.  No hx of HH or SNF.  Pt did enroll in Meds to Bed. He does not have a PCP - [facility] provider list and appointment liason # given.  Pt plans to return home upon DC.  He denies any DC needs at this time.  CCP will continue to follow and assist if needed. .........sk              Continued Care and Services - Admitted Since 2/9/2022    Coordination has not been started for this encounter.          Demographic Summary     Row Name 02/10/22 1051       General Information    Admission Type inpatient    Arrived  From home    Referral Source admission list    Reason for Consult discharge planning    Preferred Language English               Functional Status     Row Name 02/10/22 1053       Functional Status    Usual Activity Tolerance good    Current Activity Tolerance moderate       Functional Status, IADL    Medications independent    Meal Preparation independent    Housekeeping independent    Laundry independent    Shopping independent       Mental Status    General Appearance WDL WDL       Mental Status Summary    Recent Changes in Mental Status/Cognitive Functioning no changes       Employment/    Employment Status self-employed                      Patient Forms     Row Name 02/10/22 1058       Patient Forms    Provider Choice List Delivered  [facility] [place]    Delivered to Patient    Method of delivery In person                  Tara Hannon RN

## 2022-02-10 NOTE — PROGRESS NOTES
"    Chief Complaint: Pneumothorax, rib fractures, follow-up  S/P: CT-guided chest tube  POD # 1    Subjective:  Symptoms:  Stable.  No shortness of breath.    Diet:  No nausea or vomiting.    Activity level: Returning to normal.    Pain:  He complains of pain that is mild.        Vital Signs:  Temp:  [97.1 °F (36.2 °C)-98.7 °F (37.1 °C)] 97.1 °F (36.2 °C)  Heart Rate:  [59-76] 76  Resp:  [8-18] 16  BP: (107-144)/(60-96) 107/60    Intake & Output (last day)       02/09 0701  02/10 0700 02/10 0701  02/11 0700    P.O. 720     Total Intake(mL/kg) 720 (9.6)     Urine (mL/kg/hr) 0     Chest Tube 42     Total Output 42     Net +678           Urine Unmeasured Occurrence 1 x           Objective:  Vital signs: (most recent): Blood pressure 107/60, pulse 76, temperature 97.1 °F (36.2 °C), temperature source Oral, resp. rate 16, height 175.3 cm (69\"), weight 74.8 kg (165 lb), SpO2 92 %.  Vital signs are normal.  No fever.    Lungs:  Normal effort and normal respiratory rate.  He is not in respiratory distress.  There are decreased breath sounds.  No rales, wheezes or rhonchi.    Heart: Normal rate.  Regular rhythm.    Chest: Chest wall tenderness present.    Abdomen: Abdomen is soft.  There is no abdominal tenderness.     Neurological: Patient is alert and oriented to person, place and time.    Skin:  Warm and dry.              Chest tube:   Site: Left, Clean, Dry, Intact and Securement device intact  Suction: -20 cm  Air Leak: negative  24 Hour Total: 42cc    Results Review:     I reviewed the patient's new clinical results.  I reviewed the patient's new imaging results and agree with the interpretation.  I reviewed the patient's other test results and agree with the interpretation  Discussed with patient, RN and Dr. Damon.    Imaging Results (Last 24 Hours)     Procedure Component Value Units Date/Time    XR Chest 1 View [[id number]] Collected: 02/10/22 0736     Updated: 02/10/22 0744    Narrative:      XR CHEST 1 VW-  " 02/10/2022     HISTORY: Chest tube management.     Since yesterday's chest radiograph there is been placement of a small  caliber pigtail catheter terminating in the left lower hemithorax. The  left pneumothorax is now much smaller. There still is a small left  apical pneumothorax with approximately 12 mm pleural separation in the  left apex. There is some patchy atelectasis or infiltrate in the left  lung base and there may be some minimal left pleural effusion.     Left lung is slightly underinflated. There is some mild probable  atelectasis of the right lung base.     Heart size is at the upper limits of normal.       Impression:      1. Since yesterday's study there is been placement of a pigtail catheter  in the left lower hemithorax.  2. The left pneumothorax has improved from yesterday's study with small  left apical pneumothorax seen on the current study.     This report was finalized on 2/10/2022 7:41 AM by Dr. Chano Rodriguez M.D.       CT Guided Chest Tube [[id number]] Collected: 02/09/22 1656     Updated: 02/09/22 1734    Narrative:      CT GUIDED CHEST TUBE     HISTORY: Left hydropneumothorax     COMPARISON: Concurrent CT. Chest x-ray earlier today.     PROCEDURE: After informed consent was obtained and documented, the  patient was placed on the CT table in supine position. A verbal time out  was performed with appropriate identifiers confirmed. A preliminary scan  of the inferior thorax redemonstrated the left hydropneumothorax. A  route was planned for catheter placement and an appropriate skin site  chosen. This site was then prepped and draped in the usual sterile  manner and the soft tissues anesthetized with 1% lidocaine down to the  pleura. A Yueh needle catheter was placed into the pneumothorax through  which a wire was advanced and the Yueh removed. After dilation, a 10  Yi exodus drainage catheter placed into the hydropneumothorax,  coiled and locked. After initial manual evacuation of  fluid and air the  catheter was attached to an atrium device with suction. Approximately 70  mL sanguinous fluid were removed; a specimen was sent to lab. Some  relative mediastinal shift to the left was seen (likely secondary to  incomplete lung expansion). The catheter was secured with Dermabond  reinforced suture and stay-fix dressing. The patient was stable  throughout, there were no immediate complications. Radiation dose  reduction techniques were utilized, including automated exposure control  and exposure modulation based on body size.       Impression:      Successful right 10 Welsh chest tube placement for left  hemopneumothorax as described above.     This report was finalized on 2/9/2022 5:31 PM by Dr. Federico Gonzalez M.D.       CT Chest Without Contrast Diagnostic [[id number]] Collected: 02/09/22 1702     Updated: 02/09/22 1732    Narrative:      CT CHEST     HISTORY:  hydropneumothorax, left rib fractures;      COMPARISON: Chest x-ray earlier same day     TECHNIQUE: Multiple contiguous 3 mm axial computed tomographic images  were obtained through the chest without contrast. Additional axial,  sagittal, coronal, and 3D reconstructed images were also reviewed.  Radiation dose reduction techniques were utilized, including automated  exposure control and exposure modulation based on body size.     FINDINGS:   A left hemopneumothorax/hydropneumothorax (measured density around 0  Hounsfield units) is seen with a moderate to large pneumothorax  component and a small amount of fluid; no papi tension/mediastinal  shift, . Left lower lobe near entirely collapsed, left upper lobe  partially collapsed. Right lung largely clear with linear band in lower  lobe representing atelectasis or scar. A few sub-6 mm subpleural nodules  in right middle lobe (no/optional CT follow-up in low/high risk patients  per Fleischner criteria respectively). No pathologically enlarged lymph  nodes identified. Moderate hiatal hernia.  Very minimally displaced  fracture of the lateral aspect of the left sixth rib. Minimally  displaced fracture of the anterolateral aspect of the left seventh rib.  Old healed left ninth rib fracture. There is some left chest wall  emphysema primarily adjacent to the sixth rib fracture. Mild left chest  wall edema/hematoma.          Impression:         Left hydropneumothorax as described. Minimally displaced left sixth and  seventh rib fractures. See above for details and all findings.     This report was finalized on 2/9/2022 5:29 PM by Dr. Federico Gonzalez M.D.             Lab Results:     Lab Results (last 24 hours)     Procedure Component Value Units Date/Time    Non-gynecologic Cytology [[id number]] Collected: 02/09/22 1621    Specimen: Body Fluid from Pleural Cavity Updated: 02/10/22 1221    Body Fluid Culture - Body Fluid, Pleural Cavity [[id number]]  (Normal) Collected: 02/09/22 1621    Specimen: Body Fluid from Pleural Cavity Updated: 02/10/22 0743     Body Fluid Culture No growth at less than 24 hours    Troponin [[id number]]  (Normal) Collected: 02/10/22 0617    Specimen: Blood Updated: 02/10/22 0732     Troponin T <0.010 ng/mL     Narrative:      Troponin T Reference Range:  <= 0.03 ng/mL-   Negative for AMI  >0.03 ng/mL-     Abnormal for myocardial necrosis.  Clinicians would have to utilize clinical acumen, EKG, Troponin and serial changes to determine if it is an Acute Myocardial Infarction or myocardial injury due to an underlying chronic condition.       Results may be falsely decreased if patient taking Biotin.      TSH [[id number]]  (Normal) Collected: 02/10/22 0617    Specimen: Blood Updated: 02/10/22 0732     TSH 1.400 uIU/mL     Basic Metabolic Panel [[id number]]  (Abnormal) Collected: 02/10/22 0617    Specimen: Blood Updated: 02/10/22 0727     Glucose 98 mg/dL      BUN 14 mg/dL      Creatinine 0.72 mg/dL      Sodium 139 mmol/L      Potassium 3.9 mmol/L      Chloride 103 mmol/L      CO2 25.3 mmol/L       Calcium 8.8 mg/dL      eGFR Non African Amer 113 mL/min/1.73      BUN/Creatinine Ratio 19.4     Anion Gap 10.7 mmol/L     Narrative:      GFR Normal >60  Chronic Kidney Disease <60  Kidney Failure <15      Magnesium [[id number]]  (Normal) Collected: 02/10/22 0617    Specimen: Blood Updated: 02/10/22 0727     Magnesium 2.2 mg/dL     CBC & Differential [[id number]]  (Abnormal) Collected: 02/10/22 0617    Specimen: Blood Updated: 02/10/22 0721    Narrative:      The following orders were created for panel order CBC & Differential.  Procedure                               Abnormality         Status                     ---------                               -----------         ------                     CBC Auto Differential[[id number]]        Abnormal            Final result                 Please view results for these tests on the individual orders.    CBC Auto Differential [[id number]]  (Abnormal) Collected: 02/10/22 0617    Specimen: Blood Updated: 02/10/22 0721     WBC 5.07 10*3/mm3      RBC 5.16 10*6/mm3      Hemoglobin 15.6 g/dL      Hematocrit 44.8 %      MCV 86.8 fL      MCH 30.2 pg      MCHC 34.8 g/dL      RDW 13.4 %      RDW-SD 43.2 fl      MPV 10.2 fL      Platelets 235 10*3/mm3      Neutrophil % 46.9 %      Lymphocyte % 30.2 %      Monocyte % 10.7 %      Eosinophil % 10.8 %      Basophil % 1.2 %      Immature Grans % 0.2 %      Neutrophils, Absolute 2.38 10*3/mm3      Lymphocytes, Absolute 1.53 10*3/mm3      Monocytes, Absolute 0.54 10*3/mm3      Eosinophils, Absolute 0.55 10*3/mm3      Basophils, Absolute 0.06 10*3/mm3      Immature Grans, Absolute 0.01 10*3/mm3      nRBC 0.0 /100 WBC     Glucose, Body Fluid - Body Fluid, Pleural Cavity [[id number]] Collected: 02/09/22 1621    Specimen: Body Fluid from Pleural Cavity Updated: 02/09/22 1749     Glucose, Fluid 13 mg/dL     Narrative:      No Reference Ranges Established.    Serous fluid glucose less than 60 mg/dL or less than 30 mg/dL below serum glucose  suggests an infectious or malignant exudate.     This test was developed, it performance characteristics determined and judged suitable for clinical purposes by [facility].  It has not been cleared or approved by the FDA.  The laboratory is regulated under CLIA as qualified to perform high-complexity testing.     Lactate Dehydrogenase, Body Fluid - Body Fluid, Pleural Cavity [[id number]] Collected: 02/09/22 1621    Specimen: Body Fluid from Pleural Cavity Updated: 02/09/22 1748     Lactate Dehydrogenase (LD), Fluid >834 U/L     Narrative:      No Reference Ranges Established.    Serous fluid LDH greater than 60 percent of the serum LDH or serous fluid LDH two-thirds of the upper limit of normal for serum LDH suggests the fluid is an exudate.     1. Pleural TP/Serum TP >0.5  2. Pleural LD/Serum LD >0.6  3. Pleural LD >2/3 of the upper limit of normal for serum LDH    This test was developed, it performance characteristics determined and judged suitable for clinical purposes by [facility].  It has not been cleared or approved by the FDA.  The laboratory is regulated under CLIA as qualified to perform high-complexity testing.     Protein, Body Fluid - Body Fluid, Pleural Cavity [[id number]] Collected: 02/09/22 1621    Specimen: Body Fluid from Pleural Cavity Updated: 02/09/22 1735     Protein, Total, Fluid 5.1 g/dL     Narrative:      No Reference Ranges Established.    A serous fluid total fluid (TP) greater than 50 percent of the serum TP suggests the fluid is an exudate.      1. Pleural TP/Serum TP >0.5  2. Pleural LD/Serum LD >0.6  3. Pleural LD >2/3 of the upper limit of normal for serum LDH    This test was developed, it performance characteristics determined and judged suitable for clinical purposes by [facility].  It has not been cleared or approved by the FDA.  The laboratory is regulated under CLIA as qualified to perform  high-complexity testing.     pH, Body Fluid - Body Fluid, Pleural Cavity [[id number]] Collected: 02/09/22 1621    Specimen: Body Fluid from Pleural Cavity Updated: 02/09/22 1725     pH, Fluid 7.08    Narrative:      Reference Range:  Serous fluids 6.8-7.6     Pleural transudates: 7.4-7.5     Pleural exudates: 7.35-7.45     All other fluids: No defined reference ranges    This test was developed, its performance characteristics determined and judged suitable for clinical purposes by [facility]. It has not been cleared or approved by the FDA. The laboratory is regulated under CLIA as qualified to perform high-complexity testing.    AFB Culture - Body Fluid, Pleural Cavity [[id number]] Collected: 02/09/22 1621    Specimen: Body Fluid from Pleural Cavity Updated: 02/09/22 1627           Assessment/Plan       Closed traumatic fracture of ribs of left side with pneumothorax    History of pulmonary embolism    Asthma    Hearing loss    History of anxiety state    Hypoxia       Assessment & Plan     Patient is new to me today.    Radiographic imaging was independently reviewed.  Yesterday CT of the chest demonstrated a large left hemopneumothorax.  No mediastinal shift.  Left lower lobe is nearly completely collapsed with partial collapse of the left upper lobe.  This morning's chest x-ray demonstrates significant improvement in left pneumothorax s/p placement of small bore chest tube.    Traumatic pneumothorax: Significant improvement on today's imaging.  Place chest tube to waterseal.  Recheck x-ray in a.m.    Multiple rib fractures: Fractures of the left lateral sixth and seventh rib.  Minimal displacement.  Treat conservatively with pain management.  Encourage patient to perform good pulmonary hygiene with incentive spirometry and increase his ambulation to reduce his risk of pneumonia.      RICK Winn  [facility]  02/10/22  14:46 EST    Patient was seen and assessed  while wearing personal protective equipment including facemask, protective eyewear and gloves.  Hand hygiene performed prior to entering the room and upon exiting with doffing of gloves.
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
## 2022-02-10 NOTE — PLAN OF CARE
Goal Outcome Evaluation:  Plan of Care Reviewed With: patient        Progress: improving  Outcome Summary: Left chest tube remains to suction, scant amount of drainage. No air leak or sub Q air noted. Medicated x 2 for pain. VSS. O2 for sleep.

## 2022-02-10 NOTE — PLAN OF CARE
Vss, Ra, Sinus sury, A&O, up ad jessie. C/o L flank pain treated with PRN oxy. Chest tube to waterseal. Medication from home sealed in security bag at bedside and placed in narcotic drawer on the floor with patient's consent. No distress.

## 2022-02-11 ENCOUNTER — APPOINTMENT (OUTPATIENT)
Dept: GENERAL RADIOLOGY | Facility: HOSPITAL | Age: 57
End: 2022-02-11

## 2022-02-11 ENCOUNTER — READMISSION MANAGEMENT (OUTPATIENT)
Dept: CALL CENTER | Facility: HOSPITAL | Age: 57
End: 2022-02-11

## 2022-02-11 VITALS
RESPIRATION RATE: 16 BRPM | SYSTOLIC BLOOD PRESSURE: 138 MMHG | TEMPERATURE: 98.1 F | HEIGHT: 69 IN | BODY MASS INDEX: 24.44 KG/M2 | HEART RATE: 73 BPM | DIASTOLIC BLOOD PRESSURE: 89 MMHG | OXYGEN SATURATION: 91 % | WEIGHT: 165 LBS

## 2022-02-11 LAB
ANION GAP SERPL CALCULATED.3IONS-SCNC: 7.3 MMOL/L (ref 5–15)
BASOPHILS # BLD AUTO: 0.04 10*3/MM3 (ref 0–0.2)
BASOPHILS NFR BLD AUTO: 0.8 % (ref 0–1.5)
BUN SERPL-MCNC: 20 MG/DL (ref 6–20)
BUN/CREAT SERPL: 20.6 (ref 7–25)
CALCIUM SPEC-SCNC: 8.3 MG/DL (ref 8.6–10.5)
CHLORIDE SERPL-SCNC: 105 MMOL/L (ref 98–107)
CO2 SERPL-SCNC: 26.7 MMOL/L (ref 22–29)
CREAT SERPL-MCNC: 0.97 MG/DL (ref 0.76–1.27)
CYTO UR: NORMAL
DEPRECATED RDW RBC AUTO: 42.3 FL (ref 37–54)
EOSINOPHIL # BLD AUTO: 0.51 10*3/MM3 (ref 0–0.4)
EOSINOPHIL NFR BLD AUTO: 10.4 % (ref 0.3–6.2)
ERYTHROCYTE [DISTWIDTH] IN BLOOD BY AUTOMATED COUNT: 13.1 % (ref 12.3–15.4)
GFR SERPL CREATININE-BSD FRML MDRD: 80 ML/MIN/1.73
GLUCOSE SERPL-MCNC: 103 MG/DL (ref 65–99)
HCT VFR BLD AUTO: 41.5 % (ref 37.5–51)
HGB BLD-MCNC: 14.4 G/DL (ref 13–17.7)
IMM GRANULOCYTES # BLD AUTO: 0 10*3/MM3 (ref 0–0.05)
IMM GRANULOCYTES NFR BLD AUTO: 0 % (ref 0–0.5)
LAB AP CASE REPORT: NORMAL
LAB AP DIAGNOSIS COMMENT: NORMAL
LYMPHOCYTES # BLD AUTO: 1.32 10*3/MM3 (ref 0.7–3.1)
LYMPHOCYTES NFR BLD AUTO: 26.8 % (ref 19.6–45.3)
MCH RBC QN AUTO: 30.5 PG (ref 26.6–33)
MCHC RBC AUTO-ENTMCNC: 34.7 G/DL (ref 31.5–35.7)
MCV RBC AUTO: 87.9 FL (ref 79–97)
MONOCYTES # BLD AUTO: 0.53 10*3/MM3 (ref 0.1–0.9)
MONOCYTES NFR BLD AUTO: 10.8 % (ref 5–12)
NEUTROPHILS NFR BLD AUTO: 2.52 10*3/MM3 (ref 1.7–7)
NEUTROPHILS NFR BLD AUTO: 51.2 % (ref 42.7–76)
NRBC BLD AUTO-RTO: 0 /100 WBC (ref 0–0.2)
PATH REPORT.FINAL DX SPEC: NORMAL
PATH REPORT.GROSS SPEC: NORMAL
PLATELET # BLD AUTO: 214 10*3/MM3 (ref 140–450)
PMV BLD AUTO: 9.8 FL (ref 6–12)
POTASSIUM SERPL-SCNC: 4.2 MMOL/L (ref 3.5–5.2)
RBC # BLD AUTO: 4.72 10*6/MM3 (ref 4.14–5.8)
SODIUM SERPL-SCNC: 139 MMOL/L (ref 136–145)
WBC NRBC COR # BLD: 4.92 10*3/MM3 (ref 3.4–10.8)

## 2022-02-11 PROCEDURE — 94799 UNLISTED PULMONARY SVC/PX: CPT

## 2022-02-11 PROCEDURE — 71045 X-RAY EXAM CHEST 1 VIEW: CPT

## 2022-02-11 PROCEDURE — 80048 BASIC METABOLIC PNL TOTAL CA: CPT | Performed by: INTERNAL MEDICINE

## 2022-02-11 PROCEDURE — 94760 N-INVAS EAR/PLS OXIMETRY 1: CPT

## 2022-02-11 PROCEDURE — 85025 COMPLETE CBC W/AUTO DIFF WBC: CPT | Performed by: INTERNAL MEDICINE

## 2022-02-11 PROCEDURE — 99232 SBSQ HOSP IP/OBS MODERATE 35: CPT | Performed by: NURSE PRACTITIONER

## 2022-02-11 RX ORDER — CELECOXIB 100 MG/1
100 CAPSULE ORAL 2 TIMES DAILY
Qty: 28 CAPSULE | Refills: 0 | Status: SHIPPED | OUTPATIENT
Start: 2022-02-11 | End: 2022-02-25

## 2022-02-11 RX ORDER — ALBUTEROL SULFATE 90 UG/1
2 AEROSOL, METERED RESPIRATORY (INHALATION) EVERY 4 HOURS PRN
Qty: 8.5 G | Refills: 0 | Status: SHIPPED | OUTPATIENT
Start: 2022-02-11 | End: 2022-03-13

## 2022-02-11 RX ORDER — LIDOCAINE 50 MG/G
1 PATCH TOPICAL
Qty: 15 PATCH | Refills: 0 | Status: SHIPPED | OUTPATIENT
Start: 2022-02-12 | End: 2022-02-27

## 2022-02-11 RX ORDER — OXYCODONE HYDROCHLORIDE 5 MG/1
5 TABLET ORAL EVERY 4 HOURS PRN
Qty: 18 TABLET | Refills: 0 | Status: SHIPPED | OUTPATIENT
Start: 2022-02-11 | End: 2022-02-16

## 2022-02-11 RX ORDER — ALBUTEROL SULFATE 90 UG/1
2 AEROSOL, METERED RESPIRATORY (INHALATION) EVERY 4 HOURS PRN
Qty: 8.5 G | Refills: 0 | Status: SHIPPED | OUTPATIENT
Start: 2022-02-11

## 2022-02-11 RX ORDER — ACETAMINOPHEN 500 MG
1000 TABLET ORAL 3 TIMES DAILY
Qty: 60 TABLET | Refills: 0 | Status: SHIPPED | OUTPATIENT
Start: 2022-02-11 | End: 2022-02-21

## 2022-02-11 RX ADMIN — OXYCODONE 5 MG: 5 TABLET ORAL at 08:57

## 2022-02-11 RX ADMIN — BUDESONIDE AND FORMOTEROL FUMARATE DIHYDRATE 2 PUFF: 160; 4.5 AEROSOL RESPIRATORY (INHALATION) at 07:51

## 2022-02-11 RX ADMIN — DOCUSATE SODIUM 50MG AND SENNOSIDES 8.6MG 2 TABLET: 8.6; 5 TABLET, FILM COATED ORAL at 08:57

## 2022-02-11 RX ADMIN — ACETAMINOPHEN 1000 MG: 500 TABLET ORAL at 08:57

## 2022-02-11 RX ADMIN — PANTOPRAZOLE SODIUM 40 MG: 40 TABLET, DELAYED RELEASE ORAL at 06:08

## 2022-02-11 RX ADMIN — CELECOXIB 100 MG: 100 CAPSULE ORAL at 08:57

## 2022-02-11 RX ADMIN — LIDOCAINE 1 PATCH: 50 PATCH TOPICAL at 08:57

## 2022-02-11 RX ADMIN — GABAPENTIN 300 MG: 300 CAPSULE ORAL at 08:57

## 2022-02-11 NOTE — DISCHARGE SUMMARY
Patient Name: Sylvester Tate  : 1965  MRN: [id number]    Date of Admission: 2022  Date of Discharge:  2022  Primary Care Physician: Provider, No Known      Chief Complaint:   Fall, Rib Pain, and Coughing Up Blood      Discharge Diagnoses     Active Hospital Problems    Diagnosis  POA   • Closed traumatic fracture of ribs of left side with pneumothorax [S22.42XA, S27.0XXA]  Yes   • History of pulmonary embolism [Z86.711]  Yes   • Hypoxia [R09.02]  Yes   • Hearing loss [H91.90]  Yes   • Asthma [J45.909]  Yes   • History of anxiety state [Z91.89]  Yes      Resolved Hospital Problems   No resolved problems to display.        Hospital Course     Mr. Tate is a 56 y.o. male with a history of asthma with reactive airway disease who presented to [facility] initially complaining of shortness of breath and pink frothy sputum.  Please see the admitting history and physical for further details.  He was found to have pneumothorax and was admitted to the hospital for further evaluation and treatment.  Patient had had a fall a few days prior to admission and suffered some rib fractures.  He presented with increasing shortness of breath and was found of pneumothorax.  He was taken to interventional radiology where a chest tube was placed and thoracic surgery was consulted.  He quickly improved over 3 days in the hospital his chest tube has been removed and his x-ray shows no residual pneumothorax.  The plan is to follow-up with thoracic surgery in the office in about 2 or 3 weeks.  At this time he stable for discharge home.  Plan was discussed with the patient at the bedside he is eager to get out of the hospital today        Day of Discharge     Subjective:  Overall he is feeling better denies new issues or complaints and wants to go home today    Physical Exam:  Temp:  [98.1 °F (36.7 °C)-99.1 °F (37.3 °C)] 98.1 °F (36.7 °C)  Heart Rate:  [60-87] 73  Resp:  [16] 16  BP: (102-145)/(64-90)  138/89  Body mass index is 24.37 kg/m².  Physical Exam  Vitals and nursing note reviewed.   Constitutional:       Appearance: Normal appearance.   Cardiovascular:      Rate and Rhythm: Normal rate and regular rhythm.   Neurological:      Mental Status: He is alert.         Consultants     Consult Orders (all) (From admission, onward)     Start     Ordered    02/09/22 1739  Inpatient Consult to Case Management   Once        Provider:  (Not yet assigned)    02/09/22 1740    02/09/22 1343  [facility] (on-call MD unless specified) Details  Once,   Status:  Canceled        Specialty:  Hospitalist  Provider:  (Not yet assigned)    02/09/22 1342    02/09/22 1317  IP General Consult (Use specialty-specific consult if known)  Once        Provider:  Halima Aguilar MD    02/09/22 1316              Procedures     * Surgery not found *      Imaging Results (All)     Procedure Component Value Units Date/Time    XR Chest 1 View [[id number]] Resulted: 02/11/22 1157     Updated: 02/11/22 1226    XR Chest 1 View [[id number]] Collected: 02/11/22 0939     Updated: 02/11/22 1204    Narrative:      PORTABLE CHEST     HISTORY: Chest tube management.     COMPARISON: Chest x-ray 02/10/2022.      FINDINGS: Small caliber chest tube is noted on the left. A small apical  pneumothorax is noted on the left, which has decreased in size since the  examination of 02/10/2022. The heart is within normal limits in size.  There is mild atelectasis/infiltrate at the right lung base, similar in  appearance compared to prior examination. The atelectasis/infiltrate at  the left lung base is improved.       Impression:      Decreasing size of a small left apical pneumothorax.  Decreasing atelectasis/infiltrate at the left lung base.     This report was finalized on 2/11/2022 12:00 PM by Dr. Gigi San M.D.       XR Chest 1 View [[id number]] Collected: 02/10/22 0736     Updated: 02/10/22 0744    Narrative:      XR CHEST 1 VW-  02/10/2022      HISTORY: Chest tube management.     Since yesterday's chest radiograph there is been placement of a small  caliber pigtail catheter terminating in the left lower hemithorax. The  left pneumothorax is now much smaller. There still is a small left  apical pneumothorax with approximately 12 mm pleural separation in the  left apex. There is some patchy atelectasis or infiltrate in the left  lung base and there may be some minimal left pleural effusion.     Left lung is slightly underinflated. There is some mild probable  atelectasis of the right lung base.     Heart size is at the upper limits of normal.       Impression:      1. Since yesterday's study there is been placement of a pigtail catheter  in the left lower hemithorax.  2. The left pneumothorax has improved from yesterday's study with small  left apical pneumothorax seen on the current study.     This report was finalized on 2/10/2022 7:41 AM by Dr. Chano Rodriguez M.D.       CT Guided Chest Tube [[id number]] Collected: 02/09/22 1656     Updated: 02/09/22 1734    Narrative:      CT GUIDED CHEST TUBE     HISTORY: Left hydropneumothorax     COMPARISON: Concurrent CT. Chest x-ray earlier today.     PROCEDURE: After informed consent was obtained and documented, the  patient was placed on the CT table in supine position. A verbal time out  was performed with appropriate identifiers confirmed. A preliminary scan  of the inferior thorax redemonstrated the left hydropneumothorax. A  route was planned for catheter placement and an appropriate skin site  chosen. This site was then prepped and draped in the usual sterile  manner and the soft tissues anesthetized with 1% lidocaine down to the  pleura. A Yueh needle catheter was placed into the pneumothorax through  which a wire was advanced and the Yueh removed. After dilation, a 10  Argentine exodus drainage catheter placed into the hydropneumothorax,  coiled and locked. After initial manual evacuation of fluid and air  the  catheter was attached to an atrium device with suction. Approximately 70  mL sanguinous fluid were removed; a specimen was sent to lab. Some  relative mediastinal shift to the left was seen (likely secondary to  incomplete lung expansion). The catheter was secured with Dermabond  reinforced suture and stay-fix dressing. The patient was stable  throughout, there were no immediate complications. Radiation dose  reduction techniques were utilized, including automated exposure control  and exposure modulation based on body size.       Impression:      Successful right 10 Mongolian chest tube placement for left  hemopneumothorax as described above.     This report was finalized on 2/9/2022 5:31 PM by Dr. Federico Gonzalez M.D.       CT Chest Without Contrast Diagnostic [[id number]] Collected: 02/09/22 1702     Updated: 02/09/22 1732    Narrative:      CT CHEST     HISTORY:  hydropneumothorax, left rib fractures;      COMPARISON: Chest x-ray earlier same day     TECHNIQUE: Multiple contiguous 3 mm axial computed tomographic images  were obtained through the chest without contrast. Additional axial,  sagittal, coronal, and 3D reconstructed images were also reviewed.  Radiation dose reduction techniques were utilized, including automated  exposure control and exposure modulation based on body size.     FINDINGS:   A left hemopneumothorax/hydropneumothorax (measured density around 0  Hounsfield units) is seen with a moderate to large pneumothorax  component and a small amount of fluid; no papi tension/mediastinal  shift, . Left lower lobe near entirely collapsed, left upper lobe  partially collapsed. Right lung largely clear with linear band in lower  lobe representing atelectasis or scar. A few sub-6 mm subpleural nodules  in right middle lobe (no/optional CT follow-up in low/high risk patients  per Fleischner criteria respectively). No pathologically enlarged lymph  nodes identified. Moderate hiatal hernia. Very minimally  displaced  fracture of the lateral aspect of the left sixth rib. Minimally  displaced fracture of the anterolateral aspect of the left seventh rib.  Old healed left ninth rib fracture. There is some left chest wall  emphysema primarily adjacent to the sixth rib fracture. Mild left chest  wall edema/hematoma.          Impression:         Left hydropneumothorax as described. Minimally displaced left sixth and  seventh rib fractures. See above for details and all findings.     This report was finalized on 2/9/2022 5:29 PM by Dr. Federico Gonzalez M.D.       XR Chest 2 View [[id number]] Collected: 02/09/22 1312     Updated: 02/09/22 1320    Narrative:      XR CHEST 2 VW-     HISTORY:  Fell Saturday, injured left ribs.     COMPARISON:  Chest radiograph 07/03/2015     FINDINGS:    2 views of the chest were obtained. The cardiac silhouette and  mediastinal and hilar contours are within normal limits. There is a  moderate to large left hydropneumothorax. The apical pneumothorax  component measures up to at least 6.3 cm in craniocaudal dimension.  There is also a basilar component of the pneumothorax. There is a small  layering pleural fluid component. There is atelectasis of the lower left  lung. The right lung and pleural space are clear. There is an acute  fracture of lateral left rib 7. There is mild irregularity of lateral  left rib 9, which may reflect an additional fracture. There is an old  fracture of posterolateral right rib 7. Consider further evaluation with  dedicated rib radiographs or CT. There are small degenerative endplate  osteophytes at a few levels spine.     Discussed with Dr. Schwartz at 1:15 PM.     This report was finalized on 2/9/2022 1:17 PM by Dr. Josefina Betts M.D.               Pertinent Labs     Results from last 7 days   Lab Units 02/11/22  0444 02/10/22  0617 02/09/22  1334   WBC 10*3/mm3 4.92 5.07 8.37   HEMOGLOBIN g/dL 14.4 15.6 16.3   PLATELETS 10*3/mm3 214 235 253     Results from last 7 days    Lab Units 02/11/22  0444 02/10/22  0617 02/09/22  1334   SODIUM mmol/L 139 139 136   POTASSIUM mmol/L 4.2 3.9 3.8   CHLORIDE mmol/L 105 103 101   CO2 mmol/L 26.7 25.3 25.0   BUN mg/dL 20 14 13   CREATININE mg/dL 0.97 0.72* 0.88   GLUCOSE mg/dL 103* 98 102*   EGFR IF NONAFRICN AM mL/min/1.73 80 113 89     Results from last 7 days   Lab Units 02/09/22  1334   ALBUMIN g/dL 4.50   BILIRUBIN mg/dL 0.5   ALK PHOS U/L 99   AST (SGOT) U/L 12   ALT (SGPT) U/L 9     Results from last 7 days   Lab Units 02/11/22  0444 02/10/22  0617 02/09/22  1334   CALCIUM mg/dL 8.3* 8.8 8.9   ALBUMIN g/dL  --   --  4.50   MAGNESIUM mg/dL  --  2.2  --        Results from last 7 days   Lab Units 02/10/22  0617   TROPONIN T ng/mL <0.010           Invalid input(s): LDLCALC  Results from last 7 days   Lab Units 02/09/22  1621   BODYFLDCX  No growth at 2 days     Results from last 7 days   Lab Units 02/09/22  1334   COVID19  Not Detected       Test Results Pending at Discharge     Pending Labs     Order Current Status    AFB Culture - Body Fluid, Pleural Cavity In process    Body Fluid Culture - Body Fluid, Pleural Cavity Preliminary result          Discharge Details        Discharge Medications      Continue These Medications      Instructions Start Date   albuterol sulfate  (90 Base) MCG/ACT inhaler  Commonly known as: PROVENTIL HFA;VENTOLIN HFA;PROAIR HFA   2 puffs, Inhalation, Every 4 Hours PRN      budesonide-formoterol 160-4.5 MCG/ACT inhaler  Commonly known as: SYMBICORT   2 puffs, Inhalation, 2 Times Daily - RT      ibuprofen 800 MG tablet  Commonly known as: ADVIL,MOTRIN   800 mg, Oral, Daily      omeprazole 40 MG capsule  Commonly known as: priLOSEC   40 mg, Oral, Daily             Allergies   Allergen Reactions   • Hydrocodone GI Intolerance     N/V         Discharge Disposition:  Home or Self Care      Discharge Diet:  Diet Order   Procedures   • Diet Regular       Discharge Activity:   Activity Instructions     Activity as  Tolerated            CODE STATUS:    Code Status and Medical Interventions:   Ordered at: 02/09/22 1740     Code Status (Patient has no pulse and is not breathing):    CPR (Attempt to Resuscitate)     Medical Interventions (Patient has pulse or is breathing):    Full       No future appointments.  Additional Instructions for the Follow-ups that You Need to Schedule     Discharge Follow-up with PCP   As directed       Currently Documented PCP:    Provider, No Known    PCP Phone Number:    None     Follow Up Details: 2 weeks         Discharge Follow-up with Specified Provider: thoracic surgery; 2 Weeks   As directed      To: thoracic surgery    Follow Up: 2 Weeks            Follow-up Information     Provider, No Known .    Why: 2 weeks  Contact information:  [facility] [id number]                         Additional Instructions for the Follow-ups that You Need to Schedule     Discharge Follow-up with PCP   As directed       Currently Documented PCP:    Provider, No Known    PCP Phone Number:    None     Follow Up Details: 2 weeks         Discharge Follow-up with Specified Provider: thoracic surgery; 2 Weeks   As directed      To: thoracic surgery    Follow Up: 2 Weeks           Time Spent on Discharge:  Greater than 30 minutes      Gigi Murray MD  [facility]  02/11/22  12:56 EST

## 2022-02-11 NOTE — PLAN OF CARE
Problem: Adult Inpatient Plan of Care  Goal: Absence of Hospital-Acquired Illness or Injury  Intervention: Identify and Manage Fall Risk  Recent Flowsheet Documentation  Taken 2/11/2022 0800 by Karly Ivory RN  Safety Promotion/Fall Prevention: activity supervised  Intervention: Prevent and Manage VTE (venous thromboembolism) Risk  Recent Flowsheet Documentation  Taken 2/11/2022 0800 by Karly Ivory RN  VTE Prevention/Management:   bilateral   dorsiflexion/plantar flexion performed  Intervention: Prevent Infection  Recent Flowsheet Documentation  Taken 2/11/2022 0800 by Karly Ivory RN  Infection Prevention: single patient room provided  Goal: Optimal Comfort and Wellbeing  Intervention: Provide Person-Centered Care  Recent Flowsheet Documentation  Taken 2/11/2022 0800 by Karly Ivory RN  Trust Relationship/Rapport:   care explained   choices provided     Problem: Asthma Comorbidity  Goal: Maintenance of Asthma Control  Intervention: Maintain Asthma Symptom Control  Recent Flowsheet Documentation  Taken 2/11/2022 0800 by Karly Ivory RN  Medication Review/Management: medications reviewed     Problem: Fall Injury Risk  Goal: Absence of Fall and Fall-Related Injury  Intervention: Identify and Manage Contributors to Fall Injury Risk  Recent Flowsheet Documentation  Taken 2/11/2022 0800 by Karly Ivory RN  Medication Review/Management: medications reviewed  Intervention: Promote Injury-Free Environment  Recent Flowsheet Documentation  Taken 2/11/2022 0800 by Karly Ivory RN  Safety Promotion/Fall Prevention: activity supervised   Goal Outcome Evaluation:

## 2022-02-11 NOTE — PROGRESS NOTES
"    Chief Complaint: Pneumothorax, rib fractures, follow-up  S/P: CT-guided chest tube  POD # 2    Subjective:  Symptoms:  Stable.  No shortness of breath.    Diet:  No nausea or vomiting.    Activity level: Returning to normal.    Pain:  He complains of pain that is mild.        Vital Signs:  Temp:  [98 °F (36.7 °C)-99.1 °F (37.3 °C)] 98.1 °F (36.7 °C)  Heart Rate:  [60-87] 73  Resp:  [16] 16  BP: (102-145)/(64-90) 138/89    Intake & Output (last day)       02/10 0701  02/11 0700 02/11 0701  02/12 0700    P.O. 480     Total Intake(mL/kg) 480 (6.4)     Urine (mL/kg/hr) 0 (0) 250 (0.4)    Stool  0    Chest Tube 38     Total Output 38 250    Net +442 -250          Urine Unmeasured Occurrence 1 x 1 x    Stool Unmeasured Occurrence  1 x          Objective:  General Appearance:  Comfortable, in no acute distress and not in pain.    Vital signs: (most recent): Blood pressure 138/89, pulse 73, temperature 98.1 °F (36.7 °C), temperature source Oral, resp. rate 16, height 175.3 cm (69\"), weight 74.8 kg (165 lb), SpO2 91 %.  Vital signs are normal.  No fever.    Lungs:  Normal effort and normal respiratory rate.  He is not in respiratory distress.  There are decreased breath sounds.  No rales, wheezes or rhonchi.    Heart: Normal rate.  Regular rhythm.    Chest: Chest wall tenderness present.    Abdomen: Abdomen is soft.  There is no abdominal tenderness.     Extremities: Normal range of motion.  There is no dependent edema.    Neurological: Patient is alert and oriented to person, place and time.    Skin:  Warm and dry.              Chest tube:   Site: Left, Clean, Dry, Intact and Securement device intact  Suction: waterseal  Air Leak: negative  24 Hour Total: 38cc    Results Review:     I reviewed the patient's new clinical results.  I reviewed the patient's new imaging results and agree with the interpretation.  I reviewed the patient's other test results and agree with the interpretation  Discussed with patient, RN and " Dr. Mcbride    Imaging Results (Last 24 Hours)     Procedure Component Value Units Date/Time    XR Chest 1 View [[id number]] Collected: 02/11/22 1324     Updated: 02/11/22 1330    Narrative:      XR CHEST 1 VW-     HISTORY: Male who is 56 years-old,  chest tube     TECHNIQUE: Frontal view of the chest     COMPARISON: 02/11/2022 at 0545 hours     FINDINGS: Left chest tube appears stable. Heart, mediastinum and  pulmonary vasculature are unremarkable. Previous small left apical  pneumothorax is not definitely seen, may be obscured or resolved.  Suggestion of minimal left pleural effusion. No focal pulmonary  consolidation or right pneumothorax. No acute osseous process.       Impression:      Previous small left apical pneumothorax is not definitely  seen, may be obscured or resolved.      This report was finalized on 2/11/2022 1:27 PM by Dr. Adolfo Lira M.D.       XR Chest 1 View [[id number]] Collected: 02/11/22 0939     Updated: 02/11/22 1204    Narrative:      PORTABLE CHEST     HISTORY: Chest tube management.     COMPARISON: Chest x-ray 02/10/2022.      FINDINGS: Small caliber chest tube is noted on the left. A small apical  pneumothorax is noted on the left, which has decreased in size since the  examination of 02/10/2022. The heart is within normal limits in size.  There is mild atelectasis/infiltrate at the right lung base, similar in  appearance compared to prior examination. The atelectasis/infiltrate at  the left lung base is improved.       Impression:      Decreasing size of a small left apical pneumothorax.  Decreasing atelectasis/infiltrate at the left lung base.     This report was finalized on 2/11/2022 12:00 PM by Dr. Gigi San M.D.             Lab Results:     Lab Results (last 24 hours)     Procedure Component Value Units Date/Time    Non-gynecologic Cytology [[id number]] Collected: 02/09/22 1621    Specimen: Body Fluid from Pleural Cavity Updated: 02/11/22 1225     Case Report --      Non-gynecologic Cytology                          Case: [id number]                                  Authorizing Provider:  Noelle Lazaro DNP,     Collected:           02/09/2022 04:21 PM                                 APRN                                                                         Ordering Location:     [facility]  Received:            02/10/2022 12:21 PM                                 Emergency Department                                                         Pathologist:           Eunice Owen MD                                                          Specimen:    Pleural Cavity, Left Lung                                                                   Final Diagnosis --     1. Pleural Fluid, Left, Thoracentesis:  A. Negative for malignant cells.  B. Blood, eosinophils, and rare mesothelial cells (see Comment).        Comment --     The eosinophilic effusion is most likely secondary to the patient's pneumothorax. Clinical correlation is recommended to exclude the possibility of infection or drug-reaction.          Gross Description --     Received 10 ml of orange fluid in syringe.  Thin Prep (1) and cell block.       Microscopic Description --     Screened by:  Kaylee        Body Fluid Culture - Body Fluid, Pleural Cavity [[id number]]  (Normal) Collected: 02/09/22 1621    Specimen: Body Fluid from Pleural Cavity Updated: 02/11/22 0927     Body Fluid Culture No growth at 2 days    Basic Metabolic Panel [[id number]]  (Abnormal) Collected: 02/11/22 0444    Specimen: Blood Updated: 02/11/22 0539     Glucose 103 mg/dL      BUN 20 mg/dL      Creatinine 0.97 mg/dL      Sodium 139 mmol/L      Potassium 4.2 mmol/L      Chloride 105 mmol/L      CO2 26.7 mmol/L      Calcium 8.3 mg/dL      eGFR Non African Amer 80 mL/min/1.73      BUN/Creatinine Ratio 20.6     Anion Gap 7.3 mmol/L     Narrative:      GFR Normal >60  Chronic Kidney Disease <60  Kidney Failure <15      CBC &  Differential [[id number]]  (Abnormal) Collected: 02/11/22 0444    Specimen: Blood Updated: 02/11/22 0518    Narrative:      The following orders were created for panel order CBC & Differential.  Procedure                               Abnormality         Status                     ---------                               -----------         ------                     CBC Auto Differential[[id number]]        Abnormal            Final result                 Please view results for these tests on the individual orders.    CBC Auto Differential [[id number]]  (Abnormal) Collected: 02/11/22 0444    Specimen: Blood Updated: 02/11/22 0518     WBC 4.92 10*3/mm3      RBC 4.72 10*6/mm3      Hemoglobin 14.4 g/dL      Hematocrit 41.5 %      MCV 87.9 fL      MCH 30.5 pg      MCHC 34.7 g/dL      RDW 13.1 %      RDW-SD 42.3 fl      MPV 9.8 fL      Platelets 214 10*3/mm3      Neutrophil % 51.2 %      Lymphocyte % 26.8 %      Monocyte % 10.8 %      Eosinophil % 10.4 %      Basophil % 0.8 %      Immature Grans % 0.0 %      Neutrophils, Absolute 2.52 10*3/mm3      Lymphocytes, Absolute 1.32 10*3/mm3      Monocytes, Absolute 0.53 10*3/mm3      Eosinophils, Absolute 0.51 10*3/mm3      Basophils, Absolute 0.04 10*3/mm3      Immature Grans, Absolute 0.00 10*3/mm3      nRBC 0.0 /100 WBC            Assessment/Plan       Closed traumatic fracture of ribs of left side with pneumothorax    History of pulmonary embolism    Asthma    Hearing loss    History of anxiety state    Hypoxia       Assessment & Plan     Radiographic imaging was independently reviewed.    Traumatic pneumothorax: Appears almost completely resolved on this morning's chest x-ray.  No airleak with forceful cough.  We clamped the patient's chest tube for 4 hours and recheck a chest film which demonstrated no significant pneumothorax.  Chest tube was removed at the bedside without difficulty.  Patient tolerated well.      Multiple rib fractures: Fractures of the left lateral sixth  and seventh rib.  Minimal displacement.  Treat conservatively with pain management.  Encourage patient to perform good pulmonary hygiene with incentive spirometry and increase his ambulation to reduce his risk of pneumonia.    Okay to discharge from our standpoint.  We will arrange for patient to follow-up to see us in the office in 2 weeks for hospital performed chest x-ray.  Pain medication has been sent to the pharmacy.      RICK Winn  [facility]  02/11/22  14:38 EST    Patient was seen and assessed while wearing personal protective equipment including facemask, protective eyewear and gloves.  Hand hygiene performed prior to entering the room and upon exiting with doffing of gloves.

## 2022-02-11 NOTE — PLAN OF CARE
Goal Outcome Evaluation:               Minimal c/o pain. VSS on room air. Pt tolerating chest tube to water seal. Currently resting in bed. Will continue to monitor.

## 2022-02-12 LAB — BACTERIA FLD CULT: NORMAL

## 2022-02-12 NOTE — CASE MANAGEMENT/SOCIAL WORK
Case Management Discharge Note      Final Note: Pt discharged home. No identified needs.  ELIZABETH Webb RN    Provided Post Acute Provider List?: N/A  Provided Post Acute Provider Quality & Resource List?: N/A    Selected Continued Care - Discharged on 2/11/2022 Admission date: 2/9/2022 - Discharge disposition: Home or Self Care    Destination    No services have been selected for the patient.              Durable Medical Equipment    No services have been selected for the patient.              Dialysis/Infusion    No services have been selected for the patient.              Home Medical Care    No services have been selected for the patient.              Therapy    No services have been selected for the patient.              Community Resources    No services have been selected for the patient.              Community & DME    No services have been selected for the patient.                  Transportation Services  Private: Car    Final Discharge Disposition Code: 01 - home or self-care

## 2022-02-12 NOTE — OUTREACH NOTE
Prep Survey      Responses   [facility] facility patient discharged from? [place]   Is LACE score < 7 ? No   Emergency Room discharge w/ pulse ox? No   Eligibility Readm Mgmt   Discharge diagnosis hypoxia, pneumothorax, recent fall with rib fractures, Hx asthma   Does the patient have one of the following disease processes/diagnoses(primary or secondary)? Other   Does the patient have Home health ordered? No   Is there a DME ordered? No   Prep survey completed? Yes          Raina Manning RN

## 2022-02-14 NOTE — PAYOR COMM NOTE
"Sylvester Khan (56 y.o. Male)     ATTN: DISCHARGE SUMMARY TO REVIEW: REF# [id number]     DEPT: [phone],  [phone]              Date of Birth Social Security Number Address Home Phone MRN    1965  [address] [facility] [place] [address] [phone] [id number]    [employer] Marital Status             None        Admission Date Admission Type Admitting Provider Attending Provider Department, Room/Bed    22 Emergency Yenny Hays MD  [facility], E547/1    Discharge Date Discharge Disposition Discharge Destination          2022 Home or Self Care              Attending Provider: (none)   Allergies: Hydrocodone    Isolation: None   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)   Code Status: Prior   Advance Care Planning Activity    Ht: 175.3 cm (69\")   Wt: 74.8 kg (165 lb)    Admission Cmt: None   Principal Problem: None                Active Insurance as of 2022     Primary Coverage     Payor Plan Insurance Group Employer/Plan Group    [facility] [employer] [id number]     Payor Plan Address Payor Plan Phone Number Payor Plan Fax Number Effective Dates    [address]   2021 - None Entered    [facility] [address]       Subscriber Name Subscriber Birth Date Member ID       SYLVESTER KHAN 1965 [id number]                 Emergency Contacts      (Rel.) Home Phone Work Phone Mobile Phone    Haile Khan (Son) -- -- [phone]    Ryann Adan (Friend) -- -- [phone]               Discharge Summary      Gigi Murray MD at 22 1256              Patient Name: Sylvester Khan  : 1965  MRN: [id number]    Date of Admission: 2022  Date of Discharge:  2022  Primary Care Physician: Provider, No Known      Chief Complaint:   Fall, Rib Pain, and Coughing Up Blood      Discharge Diagnoses     Active Hospital Problems    Diagnosis  POA   • Closed traumatic fracture of ribs of left side " with pneumothorax [S22.42XA, S27.0XXA]  Yes   • History of pulmonary embolism [Z86.711]  Yes   • Hypoxia [R09.02]  Yes   • Hearing loss [H91.90]  Yes   • Asthma [J45.909]  Yes   • History of anxiety state [Z91.89]  Yes      Resolved Hospital Problems   No resolved problems to display.        Hospital Course     Mr. Tate is a 56 y.o. male with a history of asthma with reactive airway disease who presented to  initially complaining of shortness of breath and pink frothy sputum.  Please see the admitting history and physical for further details.  He was found to have pneumothorax and was admitted to the hospital for further evaluation and treatment.  Patient had had a fall a few days prior to admission and suffered some rib fractures.  He presented with increasing shortness of breath and was found of pneumothorax.  He was taken to interventional radiology where a chest tube was placed and thoracic surgery was consulted.  He quickly improved over 3 days in the hospital his chest tube has been removed and his x-ray shows no residual pneumothorax.  The plan is to follow-up with thoracic surgery in the office in about 2 or 3 weeks.  At this time he stable for discharge home.  Plan was discussed with the patient at the bedside he is eager to get out of the hospital today        Day of Discharge     Subjective:  Overall he is feeling better denies new issues or complaints and wants to go home today    Physical Exam:  Temp:  [98.1 °F (36.7 °C)-99.1 °F (37.3 °C)] 98.1 °F (36.7 °C)  Heart Rate:  [60-87] 73  Resp:  [16] 16  BP: (102-145)/(64-90) 138/89  Body mass index is 24.37 kg/m².  Physical Exam  Vitals and nursing note reviewed.   Constitutional:       Appearance: Normal appearance.   Cardiovascular:      Rate and Rhythm: Normal rate and regular rhythm.   Neurological:      Mental Status: He is alert.         Consultants     Consult Orders (all) (From admission, onward)     Start     Ordered     02/09/22 1739  Inpatient Consult to Case Management   Once        Provider:  (Not yet assigned)    02/09/22 1740    02/09/22 1343  [facility] (on-call MD unless specified) Details  Once,   Status:  Canceled        Specialty:  Hospitalist  Provider:  (Not yet assigned)    02/09/22 1342    02/09/22 1317  IP General Consult (Use specialty-specific consult if known)  Once        Provider:  Halima Aguilar MD    02/09/22 1316              Procedures     * Surgery not found *      Imaging Results (All)     Procedure Component Value Units Date/Time    XR Chest 1 View [[id number]] Resulted: 02/11/22 1157     Updated: 02/11/22 1226    XR Chest 1 View [[id number]] Collected: 02/11/22 0939     Updated: 02/11/22 1204    Narrative:      PORTABLE CHEST     HISTORY: Chest tube management.     COMPARISON: Chest x-ray 02/10/2022.      FINDINGS: Small caliber chest tube is noted on the left. A small apical  pneumothorax is noted on the left, which has decreased in size since the  examination of 02/10/2022. The heart is within normal limits in size.  There is mild atelectasis/infiltrate at the right lung base, similar in  appearance compared to prior examination. The atelectasis/infiltrate at  the left lung base is improved.       Impression:      Decreasing size of a small left apical pneumothorax.  Decreasing atelectasis/infiltrate at the left lung base.     This report was finalized on 2/11/2022 12:00 PM by Dr. Gigi San M.D.       XR Chest 1 View [[id number]] Collected: 02/10/22 0736     Updated: 02/10/22 0744    Narrative:      XR CHEST 1 VW-  02/10/2022     HISTORY: Chest tube management.     Since yesterday's chest radiograph there is been placement of a small  caliber pigtail catheter terminating in the left lower hemithorax. The  left pneumothorax is now much smaller. There still is a small left  apical pneumothorax with approximately 12 mm pleural separation in the  left apex. There is some patchy atelectasis or  infiltrate in the left  lung base and there may be some minimal left pleural effusion.     Left lung is slightly underinflated. There is some mild probable  atelectasis of the right lung base.     Heart size is at the upper limits of normal.       Impression:      1. Since yesterday's study there is been placement of a pigtail catheter  in the left lower hemithorax.  2. The left pneumothorax has improved from yesterday's study with small  left apical pneumothorax seen on the current study.     This report was finalized on 2/10/2022 7:41 AM by Dr. Chano Rodriguez M.D.       CT Guided Chest Tube [[id number]] Collected: 02/09/22 1656     Updated: 02/09/22 1734    Narrative:      CT GUIDED CHEST TUBE     HISTORY: Left hydropneumothorax     COMPARISON: Concurrent CT. Chest x-ray earlier today.     PROCEDURE: After informed consent was obtained and documented, the  patient was placed on the CT table in supine position. A verbal time out  was performed with appropriate identifiers confirmed. A preliminary scan  of the inferior thorax redemonstrated the left hydropneumothorax. A  route was planned for catheter placement and an appropriate skin site  chosen. This site was then prepped and draped in the usual sterile  manner and the soft tissues anesthetized with 1% lidocaine down to the  pleura. A Yueh needle catheter was placed into the pneumothorax through  which a wire was advanced and the Yueh removed. After dilation, a 10  Syriac exodus drainage catheter placed into the hydropneumothorax,  coiled and locked. After initial manual evacuation of fluid and air the  catheter was attached to an atrium device with suction. Approximately 70  mL sanguinous fluid were removed; a specimen was sent to lab. Some  relative mediastinal shift to the left was seen (likely secondary to  incomplete lung expansion). The catheter was secured with Dermabond  reinforced suture and stay-fix dressing. The patient was stable  throughout, there  were no immediate complications. Radiation dose  reduction techniques were utilized, including automated exposure control  and exposure modulation based on body size.       Impression:      Successful right 10 Dutch chest tube placement for left  hemopneumothorax as described above.     This report was finalized on 2/9/2022 5:31 PM by Dr. Federico Gonzalez M.D.       CT Chest Without Contrast Diagnostic [[id number]] Collected: 02/09/22 1702     Updated: 02/09/22 1732    Narrative:      CT CHEST     HISTORY:  hydropneumothorax, left rib fractures;      COMPARISON: Chest x-ray earlier same day     TECHNIQUE: Multiple contiguous 3 mm axial computed tomographic images  were obtained through the chest without contrast. Additional axial,  sagittal, coronal, and 3D reconstructed images were also reviewed.  Radiation dose reduction techniques were utilized, including automated  exposure control and exposure modulation based on body size.     FINDINGS:   A left hemopneumothorax/hydropneumothorax (measured density around 0  Hounsfield units) is seen with a moderate to large pneumothorax  component and a small amount of fluid; no papi tension/mediastinal  shift, . Left lower lobe near entirely collapsed, left upper lobe  partially collapsed. Right lung largely clear with linear band in lower  lobe representing atelectasis or scar. A few sub-6 mm subpleural nodules  in right middle lobe (no/optional CT follow-up in low/high risk patients  per Fleischner criteria respectively). No pathologically enlarged lymph  nodes identified. Moderate hiatal hernia. Very minimally displaced  fracture of the lateral aspect of the left sixth rib. Minimally  displaced fracture of the anterolateral aspect of the left seventh rib.  Old healed left ninth rib fracture. There is some left chest wall  emphysema primarily adjacent to the sixth rib fracture. Mild left chest  wall edema/hematoma.          Impression:         Left hydropneumothorax as  described. Minimally displaced left sixth and  seventh rib fractures. See above for details and all findings.     This report was finalized on 2/9/2022 5:29 PM by Dr. Federico Gonzalez M.D.       XR Chest 2 View [[id number]] Collected: 02/09/22 1312     Updated: 02/09/22 1320    Narrative:      XR CHEST 2 VW-     HISTORY:  Fell Saturday, injured left ribs.     COMPARISON:  Chest radiograph 07/03/2015     FINDINGS:    2 views of the chest were obtained. The cardiac silhouette and  mediastinal and hilar contours are within normal limits. There is a  moderate to large left hydropneumothorax. The apical pneumothorax  component measures up to at least 6.3 cm in craniocaudal dimension.  There is also a basilar component of the pneumothorax. There is a small  layering pleural fluid component. There is atelectasis of the lower left  lung. The right lung and pleural space are clear. There is an acute  fracture of lateral left rib 7. There is mild irregularity of lateral  left rib 9, which may reflect an additional fracture. There is an old  fracture of posterolateral right rib 7. Consider further evaluation with  dedicated rib radiographs or CT. There are small degenerative endplate  osteophytes at a few levels spine.     Discussed with Dr. Schwartz at 1:15 PM.     This report was finalized on 2/9/2022 1:17 PM by Dr. Josefina Betts M.D.               Pertinent Labs     Results from last 7 days   Lab Units 02/11/22  0444 02/10/22  0617 02/09/22  1334   WBC 10*3/mm3 4.92 5.07 8.37   HEMOGLOBIN g/dL 14.4 15.6 16.3   PLATELETS 10*3/mm3 214 235 253     Results from last 7 days   Lab Units 02/11/22  0444 02/10/22  0617 02/09/22  1334   SODIUM mmol/L 139 139 136   POTASSIUM mmol/L 4.2 3.9 3.8   CHLORIDE mmol/L 105 103 101   CO2 mmol/L 26.7 25.3 25.0   BUN mg/dL 20 14 13   CREATININE mg/dL 0.97 0.72* 0.88   GLUCOSE mg/dL 103* 98 102*   EGFR IF NONAFRICN AM mL/min/1.73 80 113 89     Results from last 7 days   Lab Units 02/09/22  1334    ALBUMIN g/dL 4.50   BILIRUBIN mg/dL 0.5   ALK PHOS U/L 99   AST (SGOT) U/L 12   ALT (SGPT) U/L 9     Results from last 7 days   Lab Units 02/11/22  0444 02/10/22  0617 02/09/22  1334   CALCIUM mg/dL 8.3* 8.8 8.9   ALBUMIN g/dL  --   --  4.50   MAGNESIUM mg/dL  --  2.2  --        Results from last 7 days   Lab Units 02/10/22  0617   TROPONIN T ng/mL <0.010           Invalid input(s): LDLCALC  Results from last 7 days   Lab Units 02/09/22  1621   BODYFLDCX  No growth at 2 days     Results from last 7 days   Lab Units 02/09/22  1334   COVID19  Not Detected       Test Results Pending at Discharge     Pending Labs     Order Current Status    AFB Culture - Body Fluid, Pleural Cavity In process    Body Fluid Culture - Body Fluid, Pleural Cavity Preliminary result          Discharge Details        Discharge Medications      Continue These Medications      Instructions Start Date   albuterol sulfate  (90 Base) MCG/ACT inhaler  Commonly known as: PROVENTIL HFA;VENTOLIN HFA;PROAIR HFA   2 puffs, Inhalation, Every 4 Hours PRN      budesonide-formoterol 160-4.5 MCG/ACT inhaler  Commonly known as: SYMBICORT   2 puffs, Inhalation, 2 Times Daily - RT      ibuprofen 800 MG tablet  Commonly known as: ADVIL,MOTRIN   800 mg, Oral, Daily      omeprazole 40 MG capsule  Commonly known as: priLOSEC   40 mg, Oral, Daily             Allergies   Allergen Reactions   • Hydrocodone GI Intolerance     N/V         Discharge Disposition:  Home or Self Care      Discharge Diet:  Diet Order   Procedures   • Diet Regular       Discharge Activity:   Activity Instructions     Activity as Tolerated            CODE STATUS:    Code Status and Medical Interventions:   Ordered at: 02/09/22 0341     Code Status (Patient has no pulse and is not breathing):    CPR (Attempt to Resuscitate)     Medical Interventions (Patient has pulse or is breathing):    Full       No future appointments.  Additional Instructions for the Follow-ups that You Need to  Schedule     Discharge Follow-up with PCP   As directed       Currently Documented PCP:    Provider, No Known    PCP Phone Number:    None     Follow Up Details: 2 weeks         Discharge Follow-up with Specified Provider: thoracic surgery; 2 Weeks   As directed      To: thoracic surgery    Follow Up: 2 Weeks            Follow-up Information     Provider, No Known .    Why: 2 weeks  Contact information:  [facility] [address]                         Additional Instructions for the Follow-ups that You Need to Schedule     Discharge Follow-up with PCP   As directed       Currently Documented PCP:    Provider, No Known    PCP Phone Number:    None     Follow Up Details: 2 weeks         Discharge Follow-up with Specified Provider: thoracic surgery; 2 Weeks   As directed      To: thoracic surgery    Follow Up: 2 Weeks           Time Spent on Discharge:  Greater than 30 minutes      Gigi Murray MD  [facility]  02/11/22  12:56 EST        Electronically signed by Gigi Murray MD at 02/11/22 1508           Radha Webb, RN      Case Management   Case Management/Social Work   Signed   Date of Service:  02/11/22 1700   Creation Time:  02/12/22 1545              Signed                Show:Clear all  []Manual[x]Template[]Copied    Added by:  [x]Radha Webb, RN      []Keely for details    Case Management Discharge Note        Final Note: Pt discharged home. No identified needs.  ELIZABETH Webb RN     Provided Post Acute Provider List?: N/A  Provided Post Acute Provider Quality & Resource List?: N/A         Selected Continued Care - Discharged on 2/11/2022 Admission date: 2/9/2022 - Discharge disposition: Home or Self Care     Destination    No services have been selected for the patient.                 Durable Medical Equipment    No services have been selected for the patient.                 Dialysis/Infusion    No services have been selected for  the patient.                 Home Medical Care    No services have been selected for the patient.                 Therapy    No services have been selected for the patient.                 Community Resources    No services have been selected for the patient.                 Community & [facility]    No services have been selected for the patient.                      Transportation Services  Private: Car     Final Discharge Disposition Code: 01 - home or self-care

## 2022-02-15 ENCOUNTER — READMISSION MANAGEMENT (OUTPATIENT)
Dept: CALL CENTER | Facility: HOSPITAL | Age: 57
End: 2022-02-15

## 2022-02-15 NOTE — OUTREACH NOTE
Medical Week 1 Survey      Responses   [facility] patient discharged from? [place]   Does the patient have one of the following disease processes/diagnoses(primary or secondary)? Other   Week 1 attempt successful? Yes   Call start time 1205   Call end time 1210   Discharge diagnosis hypoxia, pneumothorax, recent fall with rib fractures, Hx asthma   Person spoke with today (if not patient) and relationship patient   Meds reviewed with patient/caregiver? Yes   Is the patient having any side effects they believe may be caused by any medication additions or changes? No   Does the patient have all medications ordered at discharge? Yes   Is the patient taking all medications as directed (includes completed medication regime)? Yes   Comments regarding appointments Pt is aware of upcoming appt.  02/23 with thoracic surgery   Does the patient have a primary care provider?  No   PCP Nursing Intervention Provided number to obtain PCP   Does the patient have an appointment with their PCP within 7 days of discharge? N/A   Has the patient kept scheduled appointments due by today? N/A   Psychosocial issues? No   Did the patient receive a copy of their discharge instructions? Yes   Nursing interventions Reviewed instructions with patient   What is the patient's perception of their health status since discharge? Improving   Is the patient/caregiver able to teach back signs and symptoms related to disease process for when to call PCP? Yes   Is the patient/caregiver able to teach back signs and symptoms related to disease process for when to call 911? Yes   Is the patient/caregiver able to teach back the hierarchy of who to call/visit for symptoms/problems? PCP, Specialist, Home health nurse, Urgent Care, ED, 911 Yes   If the patient is a current smoker, are they able to teach back resources for cessation? Not a smoker   Week 1 call completed? Yes   Wrap up additional comments Pt reports he is improving.  Sore with ambulation  but improving overall.          Jeanette Dixon RN

## 2022-02-23 ENCOUNTER — OFFICE VISIT (OUTPATIENT)
Dept: SURGERY | Facility: CLINIC | Age: 57
End: 2022-02-23

## 2022-02-23 ENCOUNTER — HOSPITAL ENCOUNTER (OUTPATIENT)
Dept: GENERAL RADIOLOGY | Facility: HOSPITAL | Age: 57
Discharge: HOME | End: 2022-02-23
Admitting: NURSE PRACTITIONER

## 2022-02-23 ENCOUNTER — READMISSION MANAGEMENT (OUTPATIENT)
Dept: CALL CENTER | Facility: HOSPITAL | Age: 57
End: 2022-02-23

## 2022-02-23 VITALS
DIASTOLIC BLOOD PRESSURE: 76 MMHG | TEMPERATURE: 97.2 F | HEART RATE: 63 BPM | SYSTOLIC BLOOD PRESSURE: 118 MMHG | BODY MASS INDEX: 25.78 KG/M2 | OXYGEN SATURATION: 96 % | WEIGHT: 174.6 LBS

## 2022-02-23 DIAGNOSIS — S27.0XXA CLOSED TRAUMATIC FRACTURE OF RIBS OF LEFT SIDE WITH PNEUMOTHORAX: ICD-10-CM

## 2022-02-23 DIAGNOSIS — S22.42XA CLOSED TRAUMATIC FRACTURE OF RIBS OF LEFT SIDE WITH PNEUMOTHORAX: ICD-10-CM

## 2022-02-23 DIAGNOSIS — S27.0XXA CLOSED TRAUMATIC FRACTURE OF RIBS OF LEFT SIDE WITH PNEUMOTHORAX: Primary | ICD-10-CM

## 2022-02-23 DIAGNOSIS — S22.42XA CLOSED TRAUMATIC FRACTURE OF RIBS OF LEFT SIDE WITH PNEUMOTHORAX: Primary | ICD-10-CM

## 2022-02-23 PROCEDURE — 71046 X-RAY EXAM CHEST 2 VIEWS: CPT

## 2022-02-23 PROCEDURE — 99212 OFFICE O/P EST SF 10 MIN: CPT | Performed by: NURSE PRACTITIONER

## 2022-02-23 NOTE — OUTREACH NOTE
Medical Week 2 Survey      Responses   [facility] patient discharged from? [place]   Does the patient have one of the following disease processes/diagnoses(primary or secondary)? Other   Week 2 attempt successful? Yes   Call start time 1603   Discharge diagnosis hypoxia, pneumothorax, recent fall with rib fractures, Hx asthma   Call end time 1607   Meds reviewed with patient/caregiver? Yes   Is the patient having any side effects they believe may be caused by any medication additions or changes? No   Does the patient have all medications ordered at discharge? Yes   Is the patient taking all medications as directed (includes completed medication regime)? Yes   Does the patient have a primary care provider?  Yes   Does the patient have an appointment with their PCP within 7 days of discharge? Yes   Has the patient kept scheduled appointments due by today? Yes   Has home health visited the patient within 72 hours of discharge? N/A   Psychosocial issues? No   Did the patient receive a copy of their discharge instructions? Yes   Nursing interventions Reviewed instructions with patient   What is the patient's perception of their health status since discharge? Improving   Is the patient/caregiver able to teach back signs and symptoms related to disease process for when to call PCP? Yes   Is the patient/caregiver able to teach back signs and symptoms related to disease process for when to call 911? Yes   Is the patient/caregiver able to teach back the hierarchy of who to call/visit for symptoms/problems? PCP, Specialist, Home health nurse, Urgent Care, ED, 911 Yes   Additional teach back comments was instructed by MD to avoid lifting >15 lbs, denies SOB except when masked and on exertion, states knows how to recognized breathing difficulties that need med attention   Week 2 Call Completed? Yes          Ofelia Sheffield RN

## 2022-02-23 NOTE — PROGRESS NOTES
Chief Complaint  Rib fractures, pneumothorax, follow-up    Subjective          Sylvester Tate presents to [facility] for follow-up after recent hospitalization.    History of Present Illness     Sylvester Tate is a 56-year-old gentleman who we encountered during a recent hospitalization we are consulted to evaluate him for left-sided rib fractures and a traumatic pneumothorax which required chest tube placement.  The patient suffered a fall on ice on 2/5/2022.  He presented to [facility] complaining of chest pain.  Multiple radiographic imaging is were performed.  At that time there is no evidence of pneumothorax and the patient was discharged home.  His pain and dyspnea worsened and he presented to [facility] on 2/9/2022.  At that time, work-up in the emergency department included chest imaging which demonstrated a large left pneumothorax into left lateral minimally displaced rib fractures.  The patient made a good recovery.  His chest tube was removed and he was able to discharge home.  He presents today for follow-up with chest x-ray.  He reports his chest wall pain has improved since his discharge.  He denies significant shortness of air and reports no dyspnea.  He has had no fever, chills or hemoptysis.    Objective   Vital Signs:   /76 (BP Location: Right arm, Patient Position: Sitting, Cuff Size: Adult)   Pulse 63   Temp 97.2 °F (36.2 °C) (Infrared)   Wt 79.2 kg (174 lb 9.6 oz)   SpO2 96%   BMI 25.78 kg/m²     Physical Exam  Constitutional:       Appearance: Normal appearance.   Cardiovascular:      Rate and Rhythm: Normal rate and regular rhythm.      Pulses: Normal pulses.   Pulmonary:      Effort: Pulmonary effort is normal. No respiratory distress.      Breath sounds: Normal breath sounds. No wheezing, rhonchi or rales.   Chest:      Chest wall: No swelling, tenderness or crepitus.      Comments: Left chest tube site is clean and dry.   Wound has healed nicely.  Neurological:      Mental Status: He is alert.        Result Review :   The following data was reviewed by: RICK Winn on 02/23/2022:    Data reviewed: Radiologic studies Were independently reviewed including the patient's CT of the chest performed while he was hospitalized in addition to the x-ray performed prior to his appointment today.  Today's x-ray demonstrates no evidence of pneumothorax.  There is some callus formation at the site of fracture of the left sixth and seventh ribs.  Small amount of linear atelectasis at the left base.  No acute pulmonary process.          Assessment and Plan    Diagnoses and all orders for this visit:    1. Closed traumatic fracture of ribs of left side with pneumothorax (Primary)    Mr. Tate has made an excellent recovery since his recent hospitalization.  His pain is significantly improved and he denies chest wall pain or shortness of air.  Chest x-ray demonstrates resolution of previous pneumothorax with some healing component of the left lateral rib fractures.  Recommended he slowly resume regular activities.  He can lift up to 20 pounds over the next 4 weeks and advance this slowly from there.  Recommend he see us on an as-needed basis if his symptoms worsen or fail to improve.  Thank you for allowing us to participate in the care of Sylvester Tate.      Follow Up   Return if symptoms worsen or fail to improve.  Patient was given instructions and counseling regarding his condition or for health maintenance advice. Please see specific information pulled into the AVS if appropriate.

## 2022-03-03 ENCOUNTER — READMISSION MANAGEMENT (OUTPATIENT)
Dept: CALL CENTER | Facility: HOSPITAL | Age: 57
End: 2022-03-03

## 2022-03-15 ENCOUNTER — READMISSION MANAGEMENT (OUTPATIENT)
Dept: CALL CENTER | Facility: HOSPITAL | Age: 57
End: 2022-03-15

## 2022-03-15 NOTE — OUTREACH NOTE
Medical Week 4 Survey    Flowsheet Row Responses   [facility] patient discharged from? [facility]   Does the patient have one of the following disease processes/diagnoses(primary or secondary)? Other   Week 4 attempt successful? Yes   Call start time 1321   Call end time 1326   Discharge diagnosis hypoxia, pneumothorax, recent fall with rib fractures, Hx asthma   Meds reviewed with patient/caregiver? Yes   Is the patient taking all medications as directed (includes completed medication regime)? No   Nursing Interventions --  [Pt is out of meds. He has not seen any provider. ]   Has the patient kept scheduled appointments due by today? Yes   Comments CT surgery on 2/23/22,  RN provided pt with the number to the hub for help locating a PCP.    Is the patient still receiving Home Health Services? N/A   What is the patient's perception of their health status since discharge? Improving  [Sore but improving ]   Week 4 Call Completed? Yes          AISLINN PAK - Registered Nurse

## 2022-03-23 LAB
MYCOBACTERIUM SPEC CULT: NORMAL
NIGHT BLUE STAIN TISS: NORMAL

## 2022-07-29 ENCOUNTER — HOSPITAL ENCOUNTER (EMERGENCY)
Facility: HOSPITAL | Age: 57
Discharge: HOME | End: 2022-07-29
Attending: EMERGENCY MEDICINE | Admitting: EMERGENCY MEDICINE

## 2022-07-29 ENCOUNTER — APPOINTMENT (OUTPATIENT)
Dept: GENERAL RADIOLOGY | Facility: HOSPITAL | Age: 57
End: 2022-07-29

## 2022-07-29 VITALS
TEMPERATURE: 98.4 F | HEART RATE: 71 BPM | OXYGEN SATURATION: 90 % | WEIGHT: 185.2 LBS | RESPIRATION RATE: 19 BRPM | DIASTOLIC BLOOD PRESSURE: 73 MMHG | BODY MASS INDEX: 27.43 KG/M2 | SYSTOLIC BLOOD PRESSURE: 109 MMHG | HEIGHT: 69 IN

## 2022-07-29 DIAGNOSIS — J45.41 MODERATE PERSISTENT ASTHMA WITH ACUTE EXACERBATION: Primary | ICD-10-CM

## 2022-07-29 LAB — QT INTERVAL: 386 MS

## 2022-07-29 PROCEDURE — 94799 UNLISTED PULMONARY SVC/PX: CPT

## 2022-07-29 PROCEDURE — 93010 ELECTROCARDIOGRAM REPORT: CPT | Performed by: INTERNAL MEDICINE

## 2022-07-29 PROCEDURE — 94640 AIRWAY INHALATION TREATMENT: CPT

## 2022-07-29 PROCEDURE — 93005 ELECTROCARDIOGRAM TRACING: CPT | Performed by: NURSE PRACTITIONER

## 2022-07-29 PROCEDURE — 96374 THER/PROPH/DIAG INJ IV PUSH: CPT

## 2022-07-29 PROCEDURE — 99283 EMERGENCY DEPT VISIT LOW MDM: CPT

## 2022-07-29 PROCEDURE — 25010000002 METHYLPREDNISOLONE PER 125 MG: Performed by: NURSE PRACTITIONER

## 2022-07-29 PROCEDURE — 94664 DEMO&/EVAL PT USE INHALER: CPT

## 2022-07-29 PROCEDURE — 71045 X-RAY EXAM CHEST 1 VIEW: CPT

## 2022-07-29 RX ORDER — BUDESONIDE AND FORMOTEROL FUMARATE DIHYDRATE 160; 4.5 UG/1; UG/1
2 AEROSOL RESPIRATORY (INHALATION)
Qty: 6 G | Refills: 0 | Status: SHIPPED | OUTPATIENT
Start: 2022-07-29

## 2022-07-29 RX ORDER — METHYLPREDNISOLONE SODIUM SUCCINATE 125 MG/2ML
125 INJECTION, POWDER, LYOPHILIZED, FOR SOLUTION INTRAMUSCULAR; INTRAVENOUS ONCE
Status: COMPLETED | OUTPATIENT
Start: 2022-07-29 | End: 2022-07-29

## 2022-07-29 RX ORDER — IPRATROPIUM BROMIDE AND ALBUTEROL SULFATE 2.5; .5 MG/3ML; MG/3ML
3 SOLUTION RESPIRATORY (INHALATION) ONCE
Status: COMPLETED | OUTPATIENT
Start: 2022-07-29 | End: 2022-07-29

## 2022-07-29 RX ORDER — PREDNISONE 50 MG/1
50 TABLET ORAL DAILY
Qty: 5 TABLET | Refills: 0 | Status: SHIPPED | OUTPATIENT
Start: 2022-07-29

## 2022-07-29 RX ORDER — ALBUTEROL SULFATE 90 UG/1
2 AEROSOL, METERED RESPIRATORY (INHALATION) EVERY 4 HOURS PRN
Qty: 6.7 G | Refills: 0 | Status: SHIPPED | OUTPATIENT
Start: 2022-07-29

## 2022-07-29 RX ADMIN — IPRATROPIUM BROMIDE AND ALBUTEROL SULFATE 3 ML: 2.5; .5 SOLUTION RESPIRATORY (INHALATION) at 04:51

## 2022-07-29 RX ADMIN — IPRATROPIUM BROMIDE AND ALBUTEROL SULFATE 3 ML: 2.5; .5 SOLUTION RESPIRATORY (INHALATION) at 04:52

## 2022-07-29 RX ADMIN — METHYLPREDNISOLONE SODIUM SUCCINATE 125 MG: 125 INJECTION, POWDER, FOR SOLUTION INTRAMUSCULAR; INTRAVENOUS at 05:05

## 2023-04-22 ENCOUNTER — APPOINTMENT (OUTPATIENT)
Dept: CT IMAGING | Facility: HOSPITAL | Age: 58
End: 2023-04-22
Payer: COMMERCIAL

## 2023-04-22 ENCOUNTER — APPOINTMENT (OUTPATIENT)
Dept: GENERAL RADIOLOGY | Facility: HOSPITAL | Age: 58
End: 2023-04-22
Payer: COMMERCIAL

## 2023-04-22 ENCOUNTER — HOSPITAL ENCOUNTER (EMERGENCY)
Facility: HOSPITAL | Age: 58
Discharge: HOME | End: 2023-04-22
Attending: EMERGENCY MEDICINE
Payer: COMMERCIAL

## 2023-04-22 VITALS
DIASTOLIC BLOOD PRESSURE: 80 MMHG | HEART RATE: 75 BPM | RESPIRATION RATE: 16 BRPM | SYSTOLIC BLOOD PRESSURE: 122 MMHG | TEMPERATURE: 97.2 F | OXYGEN SATURATION: 96 %

## 2023-04-22 DIAGNOSIS — T14.8XXA ABRASION: Primary | ICD-10-CM

## 2023-04-22 DIAGNOSIS — R04.2 HEMOPTYSIS: ICD-10-CM

## 2023-04-22 LAB
ALBUMIN SERPL-MCNC: 4.3 G/DL (ref 3.5–5.2)
ALBUMIN/GLOB SERPL: 1.7 G/DL
ALP SERPL-CCNC: 92 U/L (ref 39–117)
ALT SERPL W P-5'-P-CCNC: 11 U/L (ref 1–41)
ANION GAP SERPL CALCULATED.3IONS-SCNC: 7.7 MMOL/L (ref 5–15)
AST SERPL-CCNC: 12 U/L (ref 1–40)
BASOPHILS # BLD AUTO: 0.05 10*3/MM3 (ref 0–0.2)
BASOPHILS NFR BLD AUTO: 1.2 % (ref 0–1.5)
BILIRUB SERPL-MCNC: 0.4 MG/DL (ref 0–1.2)
BILIRUB UR QL STRIP: NEGATIVE
BUN SERPL-MCNC: 10 MG/DL (ref 6–20)
BUN/CREAT SERPL: 11.6 (ref 7–25)
CALCIUM SPEC-SCNC: 9.1 MG/DL (ref 8.6–10.5)
CHLORIDE SERPL-SCNC: 106 MMOL/L (ref 98–107)
CLARITY UR: ABNORMAL
CO2 SERPL-SCNC: 27.3 MMOL/L (ref 22–29)
COLOR UR: YELLOW
CREAT SERPL-MCNC: 0.86 MG/DL (ref 0.76–1.27)
DEPRECATED RDW RBC AUTO: 41.5 FL (ref 37–54)
EGFRCR SERPLBLD CKD-EPI 2021: 100.4 ML/MIN/1.73
EOSINOPHIL # BLD AUTO: 0.35 10*3/MM3 (ref 0–0.4)
EOSINOPHIL NFR BLD AUTO: 8.2 % (ref 0.3–6.2)
ERYTHROCYTE [DISTWIDTH] IN BLOOD BY AUTOMATED COUNT: 13.3 % (ref 12.3–15.4)
GLOBULIN UR ELPH-MCNC: 2.6 GM/DL
GLUCOSE SERPL-MCNC: 94 MG/DL (ref 65–99)
GLUCOSE UR STRIP-MCNC: NEGATIVE MG/DL
HCT VFR BLD AUTO: 42.6 % (ref 37.5–51)
HGB BLD-MCNC: 15.1 G/DL (ref 13–17.7)
HGB UR QL STRIP.AUTO: NEGATIVE
IMM GRANULOCYTES # BLD AUTO: 0.01 10*3/MM3 (ref 0–0.05)
IMM GRANULOCYTES NFR BLD AUTO: 0.2 % (ref 0–0.5)
KETONES UR QL STRIP: NEGATIVE
LEUKOCYTE ESTERASE UR QL STRIP.AUTO: NEGATIVE
LYMPHOCYTES # BLD AUTO: 1.18 10*3/MM3 (ref 0.7–3.1)
LYMPHOCYTES NFR BLD AUTO: 27.8 % (ref 19.6–45.3)
MCH RBC QN AUTO: 30.1 PG (ref 26.6–33)
MCHC RBC AUTO-ENTMCNC: 35.4 G/DL (ref 31.5–35.7)
MCV RBC AUTO: 84.9 FL (ref 79–97)
MONOCYTES # BLD AUTO: 0.37 10*3/MM3 (ref 0.1–0.9)
MONOCYTES NFR BLD AUTO: 8.7 % (ref 5–12)
NEUTROPHILS NFR BLD AUTO: 2.29 10*3/MM3 (ref 1.7–7)
NEUTROPHILS NFR BLD AUTO: 53.9 % (ref 42.7–76)
NITRITE UR QL STRIP: NEGATIVE
NRBC BLD AUTO-RTO: 0 /100 WBC (ref 0–0.2)
PH UR STRIP.AUTO: 5.5 [PH] (ref 5–8)
PLATELET # BLD AUTO: 205 10*3/MM3 (ref 140–450)
PMV BLD AUTO: 9.8 FL (ref 6–12)
POTASSIUM SERPL-SCNC: 3.5 MMOL/L (ref 3.5–5.2)
PROT SERPL-MCNC: 6.9 G/DL (ref 6–8.5)
PROT UR QL STRIP: NEGATIVE
RBC # BLD AUTO: 5.02 10*6/MM3 (ref 4.14–5.8)
SODIUM SERPL-SCNC: 141 MMOL/L (ref 136–145)
SP GR UR STRIP: 1.02 (ref 1–1.03)
UROBILINOGEN UR QL STRIP: ABNORMAL
WBC NRBC COR # BLD: 4.25 10*3/MM3 (ref 3.4–10.8)

## 2023-04-22 PROCEDURE — 85025 COMPLETE CBC W/AUTO DIFF WBC: CPT | Performed by: PHYSICIAN ASSISTANT

## 2023-04-22 PROCEDURE — 99283 EMERGENCY DEPT VISIT LOW MDM: CPT

## 2023-04-22 PROCEDURE — 25510000001 IOPAMIDOL 61 % SOLUTION: Performed by: EMERGENCY MEDICINE

## 2023-04-22 PROCEDURE — 71045 X-RAY EXAM CHEST 1 VIEW: CPT

## 2023-04-22 PROCEDURE — 81003 URINALYSIS AUTO W/O SCOPE: CPT | Performed by: PHYSICIAN ASSISTANT

## 2023-04-22 PROCEDURE — 71260 CT THORAX DX C+: CPT

## 2023-04-22 PROCEDURE — 74177 CT ABD & PELVIS W/CONTRAST: CPT

## 2023-04-22 PROCEDURE — 80053 COMPREHEN METABOLIC PANEL: CPT | Performed by: PHYSICIAN ASSISTANT

## 2023-04-22 RX ADMIN — IOPAMIDOL 100 ML: 612 INJECTION, SOLUTION INTRAVENOUS at 13:04
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
## 2023-04-22 NOTE — DISCHARGE INSTRUCTIONS
I would suggest alternating ice and heat to the affected area on your back.  You can take Tylenol or ibuprofen for pain.  I would also place Neosporin over the abrasion and cover it with a sterile gauze dressing.    On the issue of coughing up blood.  I do not think it is related to your injury today at all, and if it persist you should definitely follow-up with your family doctor next week
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
## 2023-10-16 ENCOUNTER — APPOINTMENT (OUTPATIENT)
Dept: GENERAL RADIOLOGY | Facility: HOSPITAL | Age: 58
End: 2023-10-16
Payer: COMMERCIAL

## 2023-10-16 ENCOUNTER — HOSPITAL ENCOUNTER (EMERGENCY)
Facility: HOSPITAL | Age: 58
Discharge: HOME | End: 2023-10-16
Attending: EMERGENCY MEDICINE | Admitting: EMERGENCY MEDICINE
Payer: COMMERCIAL

## 2023-10-16 VITALS
DIASTOLIC BLOOD PRESSURE: 97 MMHG | BODY MASS INDEX: 25.76 KG/M2 | WEIGHT: 173.94 LBS | HEART RATE: 75 BPM | RESPIRATION RATE: 16 BRPM | HEIGHT: 69 IN | TEMPERATURE: 98.2 F | SYSTOLIC BLOOD PRESSURE: 142 MMHG | OXYGEN SATURATION: 96 %

## 2023-10-16 DIAGNOSIS — J18.9 PNEUMONIA OF LEFT LOWER LOBE DUE TO INFECTIOUS ORGANISM: Primary | ICD-10-CM

## 2023-10-16 DIAGNOSIS — J90 PLEURAL EFFUSION: ICD-10-CM

## 2023-10-16 LAB
ALBUMIN SERPL-MCNC: 4.1 G/DL (ref 3.5–5.2)
ALBUMIN/GLOB SERPL: 1.3 G/DL
ALP SERPL-CCNC: 82 U/L (ref 39–117)
ALT SERPL W P-5'-P-CCNC: 10 U/L (ref 1–41)
ANION GAP SERPL CALCULATED.3IONS-SCNC: 10.1 MMOL/L (ref 5–15)
AST SERPL-CCNC: 9 U/L (ref 1–40)
BASOPHILS # BLD AUTO: 0.05 10*3/MM3 (ref 0–0.2)
BASOPHILS NFR BLD AUTO: 0.9 % (ref 0–1.5)
BILIRUB SERPL-MCNC: 0.6 MG/DL (ref 0–1.2)
BUN SERPL-MCNC: 11 MG/DL (ref 6–20)
BUN/CREAT SERPL: 13.3 (ref 7–25)
CALCIUM SPEC-SCNC: 9.3 MG/DL (ref 8.6–10.5)
CHLORIDE SERPL-SCNC: 103 MMOL/L (ref 98–107)
CO2 SERPL-SCNC: 24.9 MMOL/L (ref 22–29)
CREAT SERPL-MCNC: 0.83 MG/DL (ref 0.76–1.27)
DEPRECATED RDW RBC AUTO: 40.4 FL (ref 37–54)
EGFRCR SERPLBLD CKD-EPI 2021: 100.8 ML/MIN/1.73
EOSINOPHIL # BLD AUTO: 0.33 10*3/MM3 (ref 0–0.4)
EOSINOPHIL NFR BLD AUTO: 6.2 % (ref 0.3–6.2)
ERYTHROCYTE [DISTWIDTH] IN BLOOD BY AUTOMATED COUNT: 13.2 % (ref 12.3–15.4)
GLOBULIN UR ELPH-MCNC: 3.1 GM/DL
GLUCOSE SERPL-MCNC: 106 MG/DL (ref 65–99)
HCT VFR BLD AUTO: 42.1 % (ref 37.5–51)
HGB BLD-MCNC: 14.6 G/DL (ref 13–17.7)
IMM GRANULOCYTES # BLD AUTO: 0.01 10*3/MM3 (ref 0–0.05)
IMM GRANULOCYTES NFR BLD AUTO: 0.2 % (ref 0–0.5)
LYMPHOCYTES # BLD AUTO: 1.09 10*3/MM3 (ref 0.7–3.1)
LYMPHOCYTES NFR BLD AUTO: 20.4 % (ref 19.6–45.3)
MCH RBC QN AUTO: 29.6 PG (ref 26.6–33)
MCHC RBC AUTO-ENTMCNC: 34.7 G/DL (ref 31.5–35.7)
MCV RBC AUTO: 85.2 FL (ref 79–97)
MONOCYTES # BLD AUTO: 0.52 10*3/MM3 (ref 0.1–0.9)
MONOCYTES NFR BLD AUTO: 9.7 % (ref 5–12)
NEUTROPHILS NFR BLD AUTO: 3.35 10*3/MM3 (ref 1.7–7)
NEUTROPHILS NFR BLD AUTO: 62.6 % (ref 42.7–76)
NRBC BLD AUTO-RTO: 0 /100 WBC (ref 0–0.2)
PLATELET # BLD AUTO: 214 10*3/MM3 (ref 140–450)
PMV BLD AUTO: 10.5 FL (ref 6–12)
POTASSIUM SERPL-SCNC: 3.9 MMOL/L (ref 3.5–5.2)
PROT SERPL-MCNC: 7.2 G/DL (ref 6–8.5)
RBC # BLD AUTO: 4.94 10*6/MM3 (ref 4.14–5.8)
SODIUM SERPL-SCNC: 138 MMOL/L (ref 136–145)
WBC NRBC COR # BLD: 5.35 10*3/MM3 (ref 3.4–10.8)

## 2023-10-16 PROCEDURE — 85025 COMPLETE CBC W/AUTO DIFF WBC: CPT

## 2023-10-16 PROCEDURE — 71045 X-RAY EXAM CHEST 1 VIEW: CPT

## 2023-10-16 PROCEDURE — 99283 EMERGENCY DEPT VISIT LOW MDM: CPT

## 2023-10-16 PROCEDURE — 80053 COMPREHEN METABOLIC PANEL: CPT

## 2023-10-16 PROCEDURE — 36415 COLL VENOUS BLD VENIPUNCTURE: CPT

## 2023-10-16 RX ORDER — DOXYCYCLINE 100 MG/1
100 CAPSULE ORAL 2 TIMES DAILY
Qty: 14 CAPSULE | Refills: 0 | Status: SHIPPED | OUTPATIENT
Start: 2023-10-16 | End: 2023-10-23

## 2023-10-16 NOTE — ED PROVIDER NOTES
Time: 11:49 AM EDT  Date of encounter:  10/16/2023  Independent Historian/Clinical History and Information was obtained by:   Patient    History is limited by: N/A    Chief Complaint: Cough      History of Present Illness:  Patient is a 58 y.o. year old male who presents to the emergency department for evaluation of cough.  Patient states on Friday he was feeling unwell with nausea and vomiting.  Patient states Friday night into Saturday he vomited multiple times.  Patient states on Saturday he started to feel better but was still kind of weak.  Patient states this morning he has had resolution of his nausea and vomiting but he did have a little cough this morning.  Patient states he has chronic cough with his asthma and usually will cough up some green-tinged sputum but today states that there was some constant redness in it.  Patient voiced concern for possible blood in his cough.  Patient does states again that he has history of asthma and is usually well controlled.  Patient's not had any fevers.  Patient denies chest pain or shortness of breath.  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  Patient is not a smoker.    HPI    Patient Care Team  Primary Care Provider: Provider, No Known    Past Medical History:     Allergies   Allergen Reactions    Hydrocodone GI Intolerance     N/V       Past Medical History:   Diagnosis Date    Asthma 6/30/2021    Diverticulitis 2/9/2022    Gastroesophageal reflux disease 1/7/2019    Formatting of this note might be different from the original. takes prilosec takes prilosec takes prilosec    Hearing loss 2/9/2022    got hearing aids 10yrs ago, needs new ones, doesn't wear them now    History of anxiety state 12/31/2020    History of pulmonary embolism  "2/9/2022    had tooth extracted 2 weeks prior, was in  for six days at that time for testing, no cause discovered    Primary osteoarthritis of left knee 8/17/2020    Retained orthopedic hardware 2/9/2022    left arm    Snoring 2/9/2022    no formal sleep study     History reviewed. No pertinent surgical history.  History reviewed. No pertinent family history.    Home Medications:  Prior to Admission medications    Medication Sig Start Date End Date Taking? Authorizing Provider   albuterol sulfate  (90 Base) MCG/ACT inhaler Inhale 2 puffs Every 4 (Four) Hours As Needed for Wheezing or Shortness of Air. 2/11/22   Orin Mata APRN   albuterol sulfate  (90 Base) MCG/ACT inhaler Inhale 2 puffs Every 4 (Four) Hours As Needed for Wheezing or Shortness of Air. 7/29/22   Aleyda Urbina APRN   budesonide-formoterol (SYMBICORT) 160-4.5 MCG/ACT inhaler Inhale 2 puffs 2 (Two) Times a Day. 7/29/22   Aleyda Urbina APRN   omeprazole (priLOSEC) 40 MG capsule Take 40 mg by mouth Daily.    Provider, MD Luann   predniSONE (DELTASONE) 50 MG tablet Take 1 tablet by mouth Daily. 7/29/22   Aleyda Urbina APRN        Social History:   Social History     Tobacco Use    Smoking status: Never    Smokeless tobacco: Never         Review of Systems:  Review of Systems   Constitutional:  Negative for fever.   Respiratory:  Negative for cough and shortness of breath.    Cardiovascular:  Negative for chest pain.   Gastrointestinal:  Positive for nausea (Resolved) and vomiting (Resolved). Negative for abdominal pain.   Genitourinary:  Negative for dysuria.   Musculoskeletal:  Negative for arthralgias.        Physical Exam:  /97   Pulse 75   Temp 98.2 °F (36.8 °C) (Oral)   Resp 16   Ht 175.3 cm (69\")   Wt 78.9 kg (173 lb 15.1 oz)   SpO2 96%   BMI 25.69 kg/m²     Physical Exam  Vitals and nursing note reviewed.   Constitutional:       General: He is not in acute distress.     Appearance: Normal appearance. He " is normal weight. He is not ill-appearing, toxic-appearing or diaphoretic.   HENT:      Head: Normocephalic and atraumatic.      Nose: Nose normal.   Eyes:      Extraocular Movements: Extraocular movements intact.      Conjunctiva/sclera: Conjunctivae normal.      Pupils: Pupils are equal, round, and reactive to light.   Cardiovascular:      Rate and Rhythm: Normal rate and regular rhythm.      Heart sounds: Normal heart sounds.   Pulmonary:      Effort: Pulmonary effort is normal.      Breath sounds: Normal breath sounds. Wheezing (Right upper lobe) present.   Abdominal:      General: Abdomen is flat. Bowel sounds are normal. There is no distension.      Palpations: Abdomen is soft. There is no mass.      Tenderness: There is no abdominal tenderness. There is no guarding or rebound.      Hernia: No hernia is present.   Musculoskeletal:         General: Normal range of motion.      Cervical back: Normal range of motion and neck supple.   Skin:     General: Skin is warm and dry.   Neurological:      General: No focal deficit present.      Mental Status: He is alert and oriented to person, place, and time.   Psychiatric:         Mood and Affect: Mood normal.         Behavior: Behavior normal.         Thought Content: Thought content normal.         Judgment: Judgment normal.                Procedures:  Procedures      Medical Decision Making:    Comorbidities that affect care:    GERD, Asthma    External Notes reviewed:    Previous Radiological Studies: Chest x-ray completed on 6/4-23 that was negative for any findings      The following orders were placed and all results were independently analyzed by me:  Orders Placed This Encounter   Procedures    XR Chest 1 View    Comprehensive Metabolic Panel    CBC Auto Differential    CBC & Differential       Medications Given in the Emergency Department:  Medications - No data to display     ED Course:    ED Course as of 10/16/23 1329   Mon Oct 16, 2023   1226 XR Chest 1  View  Left pleural effusion.  Bilateral basilar areas of interstitial change similar to prior   exam.   [MD]      ED Course User Index  [MD] Alan Brown PA-C       Labs:    Lab Results (last 24 hours)       Procedure Component Value Units Date/Time    CBC & Differential [[id number]]  (Normal) Collected: 10/16/23 1221    Specimen: Blood Updated: 10/16/23 1309    Narrative:      The following orders were created for panel order CBC & Differential.  Procedure                               Abnormality         Status                     ---------                               -----------         ------                     CBC Auto Differential[[id number]]        Normal              Final result                 Please view results for these tests on the individual orders.    Comprehensive Metabolic Panel [[id number]]  (Abnormal) Collected: 10/16/23 1221    Specimen: Blood Updated: 10/16/23 1245     Glucose 106 mg/dL      BUN 11 mg/dL      Creatinine 0.83 mg/dL      Sodium 138 mmol/L      Potassium 3.9 mmol/L      Chloride 103 mmol/L      CO2 24.9 mmol/L      Calcium 9.3 mg/dL      Total Protein 7.2 g/dL      Albumin 4.1 g/dL      ALT (SGPT) 10 U/L      AST (SGOT) 9 U/L      Alkaline Phosphatase 82 U/L      Total Bilirubin 0.6 mg/dL      Globulin 3.1 gm/dL      A/G Ratio 1.3 g/dL      BUN/Creatinine Ratio 13.3     Anion Gap 10.1 mmol/L      eGFR 100.8 mL/min/1.73     Narrative:      GFR Normal >60  Chronic Kidney Disease <60  Kidney Failure <15      CBC Auto Differential [[id number]]  (Normal) Collected: 10/16/23 1221    Specimen: Blood Updated: 10/16/23 1309     WBC 5.35 10*3/mm3      RBC 4.94 10*6/mm3      Hemoglobin 14.6 g/dL      Hematocrit 42.1 %      MCV 85.2 fL      MCH 29.6 pg      MCHC 34.7 g/dL      RDW 13.2 %      RDW-SD 40.4 fl      MPV 10.5 fL      Platelets 214 10*3/mm3      Neutrophil % 62.6 %      Lymphocyte % 20.4 %      Monocyte % 9.7 %      Eosinophil % 6.2 %      Basophil % 0.9 %      Immature Grans  % 0.2 %      Neutrophils, Absolute 3.35 10*3/mm3      Lymphocytes, Absolute 1.09 10*3/mm3      Monocytes, Absolute 0.52 10*3/mm3      Eosinophils, Absolute 0.33 10*3/mm3      Basophils, Absolute 0.05 10*3/mm3      Immature Grans, Absolute 0.01 10*3/mm3      nRBC 0.0 /100 WBC              Imaging:    XR Chest 1 View    Result Date: 10/16/2023  PROCEDURE: XR CHEST 1 VW  COMPARISON: [facility], CR, XR CHEST 1 VW, 7/29/2022, 4:52.  INDICATIONS: Cough with blood tinged sputum  FINDINGS:  The heart size and pulmonary vascular markings are normal.  There is a small left pleural effusion.  There is prominence of the basilar interstitial markings similar to the prior exam.       Left pleural effusion.  Bilateral basilar areas of interstitial change similar to prior exam.       CLARY HARRIS MD       Electronically Signed and Approved By: CLARY HARRIS MD on 10/16/2023 at 12:21                Differential Diagnosis and Discussion:    Cough: Differential diagnosis includes but is not limited to pneumonia, acute bronchitis, upper respiratory infection, ACE inhibitor use, allergic reaction, epiglottitis, seasonal allergies, chemical irritants, exercise-induced asthma, viral syndrome.    All labs were reviewed and interpreted by me.  All X-rays impressions were independently interpreted by me.    MDM  Number of Diagnoses or Management Options  Pleural effusion  Pneumonia of left lower lobe due to infectious organism  Diagnosis management comments: Patient presented to the emergency department today for evaluation of cough with blood-tinged sputum.  CBC and CMP are unremarkable.  Chest x-ray does note a left-sided pleural effusion.  I will begin patient on antibiotics and have him follow-up with his primary care provider in 1 week for repeat chest x-ray.       Amount and/or Complexity of Data Reviewed  Clinical lab tests: reviewed and ordered  Tests in the radiology section of CPT®: reviewed and ordered    Risk of  Complications, Morbidity, and/or Mortality  Presenting problems: moderate  Diagnostic procedures: low  Management options: low    Patient Progress  Patient progress: stable       Consultants/Shared Management Plan:    None    Social Determinants of Health:    Patient is independent, reliable, and has access to care.       Disposition and Care Coordination:    Discharged: The patient is suitable and stable for discharge with no need for consideration of observation or admission.    I have explained the patient´s condition, diagnoses and treatment plan based on the information available to me at this time. I have answered questions and addressed any concerns. The patient has a good  understanding of the patient´s diagnosis, condition, and treatment plan as can be expected at this point. The vital signs have been stable. The patient´s condition is stable and appropriate for discharge from the emergency department.      The patient will pursue further outpatient evaluation with the primary care physician or other designated or consulting physician as outlined in the discharge instructions. They are agreeable to this plan of care and follow-up instructions have been explained in detail. The patient has received these instructions in written format and have expressed an understanding of the discharge instructions. The patient is aware that any significant change in condition or worsening of symptoms should prompt an immediate return to this or the closest emergency department or call to 911.  I have explained discharge medications and the need for follow up with the patient/caretakers. This was also printed in the discharge instructions. Patient was discharged with the following medications and follow up:      Medication List        New Prescriptions      doxycycline 100 MG capsule  Commonly known as: MONODOX  Take 1 capsule by mouth 2 (Two) Times a Day for 7 days.               Where to Get Your Medications        These  medications were sent to [id number] - [place], [place] - [address] AT SEC OF [facility] - [phone]  - [phone] FX  [address] [facility] [address]      Phone: [phone]   doxycycline 100 MG capsule      Provider, No Known  [facility] [address]             Final diagnoses:   Pneumonia of left lower lobe due to infectious organism   Pleural effusion        ED Disposition       ED Disposition   Discharge    Condition   Stable    Comment   --               This medical record created using voice recognition software.             Alan Brown PA-C  10/16/23 3926

## 2023-10-16 NOTE — DISCHARGE INSTRUCTIONS
Please take the full course of antibiotics as directed.  Please follow-up with your primary care provider in 1 week for reevaluation and repeat chest x-ray.  Return to the emergency department for new or worsening symptoms concerning to you.

## 2025-02-27 ENCOUNTER — HOSPITAL ENCOUNTER (EMERGENCY)
Facility: HOSPITAL | Age: 60
Discharge: HOME | End: 2025-02-27
Attending: EMERGENCY MEDICINE
Payer: COMMERCIAL

## 2025-02-27 ENCOUNTER — APPOINTMENT (OUTPATIENT)
Dept: GENERAL RADIOLOGY | Facility: HOSPITAL | Age: 60
End: 2025-02-27
Payer: COMMERCIAL

## 2025-02-27 VITALS
SYSTOLIC BLOOD PRESSURE: 136 MMHG | RESPIRATION RATE: 18 BRPM | BODY MASS INDEX: 26.29 KG/M2 | DIASTOLIC BLOOD PRESSURE: 82 MMHG | TEMPERATURE: 97.7 F | WEIGHT: 177.47 LBS | HEART RATE: 89 BPM | HEIGHT: 69 IN | OXYGEN SATURATION: 100 %

## 2025-02-27 DIAGNOSIS — J32.1 FRONTAL SINUSITIS, UNSPECIFIED CHRONICITY: Primary | ICD-10-CM

## 2025-02-27 LAB
FLUAV SUBTYP SPEC NAA+PROBE: NOT DETECTED
FLUBV RNA ISLT QL NAA+PROBE: NOT DETECTED
RSV RNA NPH QL NAA+NON-PROBE: NOT DETECTED
SARS-COV-2 RNA RESP QL NAA+PROBE: NOT DETECTED

## 2025-02-27 PROCEDURE — 63710000001 DIPHENHYDRAMINE PER 50 MG

## 2025-02-27 PROCEDURE — 96372 THER/PROPH/DIAG INJ SC/IM: CPT

## 2025-02-27 PROCEDURE — 25010000002 KETOROLAC TROMETHAMINE PER 15 MG

## 2025-02-27 PROCEDURE — 63710000001 ONDANSETRON ODT 4 MG TABLET DISPERSIBLE

## 2025-02-27 PROCEDURE — 71045 X-RAY EXAM CHEST 1 VIEW: CPT

## 2025-02-27 PROCEDURE — 99283 EMERGENCY DEPT VISIT LOW MDM: CPT

## 2025-02-27 PROCEDURE — 87637 SARSCOV2&INF A&B&RSV AMP PRB: CPT | Performed by: EMERGENCY MEDICINE

## 2025-02-27 RX ORDER — KETOROLAC TROMETHAMINE 30 MG/ML
30 INJECTION, SOLUTION INTRAMUSCULAR; INTRAVENOUS ONCE
Status: COMPLETED | OUTPATIENT
Start: 2025-02-27 | End: 2025-02-27

## 2025-02-27 RX ORDER — DIPHENHYDRAMINE HCL 25 MG
50 CAPSULE ORAL ONCE
Status: COMPLETED | OUTPATIENT
Start: 2025-02-27 | End: 2025-02-27

## 2025-02-27 RX ORDER — PREDNISONE 20 MG/1
20 TABLET ORAL DAILY
Qty: 5 TABLET | Refills: 0 | Status: SHIPPED | OUTPATIENT
Start: 2025-02-27 | End: 2025-03-04

## 2025-02-27 RX ORDER — ONDANSETRON 4 MG/1
4 TABLET, ORALLY DISINTEGRATING ORAL ONCE
Status: COMPLETED | OUTPATIENT
Start: 2025-02-27 | End: 2025-02-27

## 2025-02-27 RX ADMIN — DIPHENHYDRAMINE HYDROCHLORIDE 50 MG: 25 CAPSULE ORAL at 19:06

## 2025-02-27 RX ADMIN — ONDANSETRON 4 MG: 4 TABLET, ORALLY DISINTEGRATING ORAL at 19:06

## 2025-02-27 RX ADMIN — KETOROLAC TROMETHAMINE 30 MG: 30 INJECTION, SOLUTION INTRAMUSCULAR; INTRAVENOUS at 19:06

## 2025-02-28 NOTE — ED PROVIDER NOTES
Time: 7:31 PM EST  Date of encounter:  2/27/2025  Independent Historian/Clinical History and Information was obtained by:   Patient    History is limited by: N/A    Chief Complaint: Cough      History of Present Illness:  Patient is a 59 y.o. year old male who presents to the emergency department for evaluation of productive cough with green sputum and nasal congestion that began 1 week ago.  Patient states he feels like he has a sinus infection.  Patient denies chest pain shortness of breath.  Patient denies fever.  Patient denies sore throat.      Patient Care Team  Primary Care Provider: Provider, No Known    Past Medical History:     Allergies   Allergen Reactions    Hydrocodone GI Intolerance     N/V       Past Medical History:   Diagnosis Date    Asthma 6/30/2021    Diverticulitis 2/9/2022    Gastroesophageal reflux disease 1/7/2019    Formatting of this note might be different from the original. takes prilosec takes prilosec takes prilosec    Hearing loss 2/9/2022    got hearing aids 10yrs ago, needs new ones, doesn't wear them now    History of anxiety state 12/31/2020    History of pulmonary embolism 2/9/2022    had tooth extracted 2 weeks prior, was in  for six days at that time for testing, no cause discovered    Primary osteoarthritis of left knee 8/17/2020    Retained orthopedic hardware 2/9/2022    left arm    Snoring 2/9/2022    no formal sleep study     History reviewed. No pertinent surgical history.  History reviewed. No pertinent family history.    Home Medications:  Prior to Admission medications    Medication Sig Start Date End Date Taking? Authorizing Provider   albuterol sulfate  (90 Base) MCG/ACT inhaler Inhale 2 puffs Every 4 (Four) Hours As Needed for Wheezing or Shortness of Air. 2/11/22   Orin Mata APRN   albuterol sulfate  (90 Base) MCG/ACT inhaler Inhale 2 puffs Every 4 (Four) Hours As Needed for Wheezing or Shortness of Air. 7/29/22   Aleyda Urbina APRN  "  budesonide-formoterol (SYMBICORT) 160-4.5 MCG/ACT inhaler Inhale 2 puffs 2 (Two) Times a Day. 7/29/22   Aleyda Urbina APRN   omeprazole (priLOSEC) 40 MG capsule Take 40 mg by mouth Daily.    Provider, MD Luann   predniSONE (DELTASONE) 50 MG tablet Take 1 tablet by mouth Daily. 7/29/22   Aleyda Urbina APRN        Social History:   Social History     Tobacco Use    Smoking status: Never    Smokeless tobacco: Never         Review of Systems:  Review of Systems   Constitutional:  Negative for chills and fever.   HENT:  Positive for congestion. Negative for rhinorrhea and sore throat.    Eyes:  Negative for pain and visual disturbance.   Respiratory:  Positive for cough. Negative for apnea, chest tightness and shortness of breath.    Cardiovascular:  Negative for chest pain and palpitations.   Gastrointestinal:  Negative for abdominal pain, diarrhea, nausea and vomiting.   Genitourinary:  Negative for difficulty urinating and dysuria.   Musculoskeletal:  Negative for joint swelling and myalgias.   Skin:  Negative for color change.   Neurological:  Negative for seizures and headaches.   Psychiatric/Behavioral: Negative.     All other systems reviewed and are negative.       Physical Exam:  /82   Pulse 89   Temp 97.7 °F (36.5 °C)   Resp 18   Ht 175.3 cm (69\")   Wt 80.5 kg (177 lb 7.5 oz)   SpO2 100%   BMI 26.21 kg/m²     Physical Exam  Vitals and nursing note reviewed.   Constitutional:       General: He is not in acute distress.     Appearance: Normal appearance. He is not toxic-appearing.   HENT:      Head: Normocephalic and atraumatic.      Jaw: There is normal jaw occlusion.      Nose: Congestion present.   Eyes:      General: Lids are normal.      Extraocular Movements: Extraocular movements intact.      Conjunctiva/sclera: Conjunctivae normal.      Pupils: Pupils are equal, round, and reactive to light.   Cardiovascular:      Rate and Rhythm: Normal rate and regular rhythm.      Pulses: " Normal pulses.      Heart sounds: Normal heart sounds.   Pulmonary:      Effort: Pulmonary effort is normal. No respiratory distress.      Breath sounds: Normal breath sounds. No wheezing or rhonchi.   Abdominal:      General: Abdomen is flat.      Palpations: Abdomen is soft.      Tenderness: There is no abdominal tenderness. There is no guarding or rebound.   Musculoskeletal:         General: Normal range of motion.      Cervical back: Normal range of motion and neck supple.      Right lower leg: No edema.      Left lower leg: No edema.   Skin:     General: Skin is warm and dry.   Neurological:      Mental Status: He is alert and oriented to person, place, and time. Mental status is at baseline.   Psychiatric:         Mood and Affect: Mood normal.                    Medical Decision Making:      Comorbidities that affect care:    Asthma    External Notes reviewed:          The following orders were placed and all results were independently analyzed by me:  Orders Placed This Encounter   Procedures    COVID PRE-OP / PRE-PROCEDURE SCREENING ORDER (NO ISOLATION) - Swab, Nasopharynx    COVID-19, FLU A/B, RSV PCR 1 HR TAT - Swab, Nasopharynx    XR Chest 1 View       Medications Given in the Emergency Department:  Medications   ketorolac (TORADOL) injection 30 mg (30 mg Intramuscular Given 2/27/25 1906)   diphenhydrAMINE (BENADRYL) capsule 50 mg (50 mg Oral Given 2/27/25 1906)   ondansetron ODT (ZOFRAN-ODT) disintegrating tablet 4 mg (4 mg Oral Given 2/27/25 1906)        ED Course:         Labs:    Lab Results (last 24 hours)       Procedure Component Value Units Date/Time    COVID PRE-OP / PRE-PROCEDURE SCREENING ORDER (NO ISOLATION) - Swab, Nasopharynx [[id number]]  (Normal) Collected: 02/27/25 1837    Specimen: Swab from Nasopharynx Updated: 02/27/25 1919    Narrative:      The following orders were created for panel order COVID PRE-OP / PRE-PROCEDURE SCREENING ORDER (NO ISOLATION) - Swab, Nasopharynx.  Procedure                                Abnormality         Status                     ---------                               -----------         ------                     COVID-19, FLU A/B, RSV P...[[id number]]  Normal              Final result                 Please view results for these tests on the individual orders.    COVID-19, FLU A/B, RSV PCR 1 HR TAT - Swab, Nasopharynx [[id number]]  (Normal) Collected: 02/27/25 1837    Specimen: Swab from Nasopharynx Updated: 02/27/25 1919     COVID19 Not Detected     Influenza A PCR Not Detected     Influenza B PCR Not Detected     RSV, PCR Not Detected    Narrative:      Fact sheet for providers: https://www.fda.gov/media/014533/download    Fact sheet for patients: https://www.fda.gov/media/165070/download    Test performed by PCR.             Imaging:    XR Chest 1 View    Result Date: 2/27/2025  XR CHEST 1 VW Date of Exam: 2/27/2025 6:00 PM EST Indication: Cough Comparison: CXR 10/16/2023, 4/22/2023 Findings: Heart is normal in size. The lungs are well-expanded and free of infiltrates. Mild chronic changes appear stable. Bony structures appear intact.     Impression: No active disease is seen. Electronically Signed: Héctor Pollock MD  2/27/2025 6:15 PM EST  Workstation ID: [id number]       Differential Diagnosis and Discussion:    Cough: Differential diagnosis includes but is not limited to pneumonia, acute bronchitis, upper respiratory infection, ACE inhibitor use, allergic reaction, epiglottitis, seasonal allergies, chemical irritants, exercise-induced asthma, viral syndrome.    PROCEDURES:    Labs were collected in the emergency department and all labs were reviewed and interpreted by me.  X-ray were performed in the emergency department and all X-ray impressions were independently interpreted by me.    No orders to display       Procedures    MDM     Patient tested negative for COVID, influenza, RSV.  Chest x-ray shows no active disease.  Patient has sinusitis.  With antibiotics  and steroids.  I instructed patient to return to ED if he develops any new or worsening symptoms.  Otherwise follow-up with PCP.  Patient states he understands and agrees with plan of care                Patient Care Considerations:          Consultants/Shared Management Plan:    None    Social Determinants of Health:    Patient is independent, reliable, and has access to care.       Disposition and Care Coordination:    Discharged: The patient is suitable and stable for discharge with no need for consideration of admission.    I have explained the patient´s condition, diagnoses and treatment plan based on the information available to me at this time. I have answered questions and addressed any concerns. The patient has a good  understanding of the patient´s diagnosis, condition, and treatment plan as can be expected at this point. The vital signs have been stable. The patient´s condition is stable and appropriate for discharge from the emergency department.      The patient will pursue further outpatient evaluation with the primary care physician or other designated or consulting physician as outlined in the discharge instructions. They are agreeable to this plan of care and follow-up instructions have been explained in detail. The patient has received these instructions in written format and has expressed an understanding of the discharge instructions. The patient is aware that any significant change in condition or worsening of symptoms should prompt an immediate return to this or the closest emergency department or call to 911.  I have explained discharge medications and the need for follow up with the patient/caretakers. This was also printed in the discharge instructions. Patient was discharged with the following medications and follow up:      Medication List        New Prescriptions      amoxicillin-clavulanate 875-125 MG per tablet  Commonly known as: AUGMENTIN  Take 1 tablet by mouth 2 (Two) Times a Day  for 7 days.            Changed      predniSONE 20 MG tablet  Commonly known as: DELTASONE  Take 1 tablet by mouth Daily for 5 days.  What changed:   medication strength  how much to take               Where to Get Your Medications        These medications were sent to [id number] - [place], [place] - [address] AT [facility] - [phone]  - [phone]   [address] [facility] [address]      Phone: [phone]   amoxicillin-clavulanate 875-125 MG per tablet  predniSONE 20 MG tablet      Provider, No Known  [place] [address]    Call in 1 day  To schedule follow-up       Final diagnoses:   Frontal sinusitis, unspecified chronicity        ED Disposition       ED Disposition   Discharge    Condition   Stable    Comment   --               This medical record created using voice recognition software.             Kvng Jo PA-C  02/27/25 1939